# Patient Record
Sex: FEMALE | Race: WHITE | NOT HISPANIC OR LATINO | Employment: FULL TIME | URBAN - METROPOLITAN AREA
[De-identification: names, ages, dates, MRNs, and addresses within clinical notes are randomized per-mention and may not be internally consistent; named-entity substitution may affect disease eponyms.]

---

## 2023-06-26 ENCOUNTER — TELEPHONE (OUTPATIENT)
Dept: PRIMARY CARE | Facility: CLINIC | Age: 33
End: 2023-06-26
Payer: COMMERCIAL

## 2023-06-26 DIAGNOSIS — B34.9 VIRAL SYNDROME: Primary | ICD-10-CM

## 2023-06-28 ENCOUNTER — LAB (OUTPATIENT)
Dept: LAB | Facility: LAB | Age: 33
End: 2023-06-28
Payer: COMMERCIAL

## 2023-06-28 DIAGNOSIS — B34.9 VIRAL SYNDROME: ICD-10-CM

## 2023-06-28 LAB
ABO GROUP (TYPE) IN BLOOD: NORMAL
ANTIBODY SCREEN: NORMAL
CHORIOGONADOTROPIN (MIU/ML) IN SER/PLAS: 10 IU/L
ERYTHROCYTE DISTRIBUTION WIDTH (RATIO) BY AUTOMATED COUNT: 12.3 % (ref 11.5–14.5)
ERYTHROCYTE MEAN CORPUSCULAR HEMOGLOBIN CONCENTRATION (G/DL) BY AUTOMATED: 32.2 G/DL (ref 32–36)
ERYTHROCYTE MEAN CORPUSCULAR VOLUME (FL) BY AUTOMATED COUNT: 94 FL (ref 80–100)
ERYTHROCYTES (10*6/UL) IN BLOOD BY AUTOMATED COUNT: 4.6 X10E12/L (ref 4–5.2)
HEMATOCRIT (%) IN BLOOD BY AUTOMATED COUNT: 43.2 % (ref 36–46)
HEMOGLOBIN (G/DL) IN BLOOD: 13.9 G/DL (ref 12–16)
LEUKOCYTES (10*3/UL) IN BLOOD BY AUTOMATED COUNT: 6.8 X10E9/L (ref 4.4–11.3)
NRBC (PER 100 WBCS) BY AUTOMATED COUNT: 0 /100 WBC (ref 0–0)
PLATELETS (10*3/UL) IN BLOOD AUTOMATED COUNT: 189 X10E9/L (ref 150–450)
RH FACTOR: NORMAL

## 2023-06-28 PROCEDURE — 86664 EPSTEIN-BARR NUCLEAR ANTIGEN: CPT

## 2023-06-28 PROCEDURE — 86665 EPSTEIN-BARR CAPSID VCA: CPT

## 2023-06-28 PROCEDURE — 86663 EPSTEIN-BARR ANTIBODY: CPT

## 2023-06-28 PROCEDURE — 36415 COLL VENOUS BLD VENIPUNCTURE: CPT

## 2023-06-29 LAB
EBV INTERPRETATION: ABNORMAL
EPSTEIN-BARR VCA IGG: POSITIVE
EPSTEIN-BARR VCA IGM: NEGATIVE
EPSTEIN-BARR VIRUS EARLY ANTIGEN ANTIBODY, IGG: POSITIVE
EPSTIEN-BARR NUCLEAR ANTIGEN AB: POSITIVE

## 2023-06-30 ENCOUNTER — HOSPITAL ENCOUNTER (OUTPATIENT)
Dept: DATA CONVERSION | Facility: HOSPITAL | Age: 33
End: 2023-06-30
Attending: OBSTETRICS & GYNECOLOGY | Admitting: OBSTETRICS & GYNECOLOGY
Payer: COMMERCIAL

## 2023-06-30 DIAGNOSIS — Z53.8 PROCEDURE AND TREATMENT NOT CARRIED OUT FOR OTHER REASONS: ICD-10-CM

## 2023-06-30 DIAGNOSIS — N85.6 INTRAUTERINE SYNECHIAE: ICD-10-CM

## 2023-06-30 LAB — CHORIOGONADOTROPIN (MIU/ML) IN SER/PLAS: 37 MIU/ML

## 2023-07-03 LAB — CHORIOGONADOTROPIN (MIU/ML) IN SER/PLAS: 168 MIU/ML

## 2023-07-05 LAB — CHORIOGONADOTROPIN (MIU/ML) IN SER/PLAS: 509 MIU/ML

## 2023-07-12 LAB — CHORIOGONADOTROPIN (MIU/ML) IN SER/PLAS: ABNORMAL MIU/ML

## 2023-09-07 LAB
ABO GROUP (TYPE) IN BLOOD: NORMAL
ANTIBODY SCREEN: NORMAL
CHLAMYDIA TRACH., AMPLIFIED: NORMAL
ERYTHROCYTE DISTRIBUTION WIDTH (RATIO) BY AUTOMATED COUNT: 12.6 % (ref 11.5–14.5)
ERYTHROCYTE MEAN CORPUSCULAR HEMOGLOBIN CONCENTRATION (G/DL) BY AUTOMATED: 33.5 G/DL (ref 32–36)
ERYTHROCYTE MEAN CORPUSCULAR VOLUME (FL) BY AUTOMATED COUNT: 91 FL (ref 80–100)
ERYTHROCYTES (10*6/UL) IN BLOOD BY AUTOMATED COUNT: 3.95 X10E12/L (ref 4–5.2)
HEMATOCRIT (%) IN BLOOD BY AUTOMATED COUNT: 36.1 % (ref 36–46)
HEMOGLOBIN (G/DL) IN BLOOD: 12.1 G/DL (ref 12–16)
HEPATITIS B VIRUS SURFACE AG PRESENCE IN SERUM: NONREACTIVE
HEPATITIS C VIRUS AB PRESENCE IN SERUM: NONREACTIVE
HIV 1/ 2 AG/AB SCREEN: NONREACTIVE
LEUKOCYTES (10*3/UL) IN BLOOD BY AUTOMATED COUNT: 9.5 X10E9/L (ref 4.4–11.3)
N. GONORRHEA, AMPLIFIED: NORMAL
NRBC (PER 100 WBCS) BY AUTOMATED COUNT: 0 /100 WBC (ref 0–0)
PLATELETS (10*3/UL) IN BLOOD AUTOMATED COUNT: 187 X10E9/L (ref 150–450)
REFLEX ADDED, ANEMIA PANEL: ABNORMAL
RH FACTOR: NORMAL
RUBELLA VIRUS IGG AB: NORMAL
SYPHILIS TOTAL AB: NONREACTIVE

## 2023-09-08 LAB
CHLAMYDIA TRACH., AMPLIFIED: NEGATIVE
N. GONORRHEA, AMPLIFIED: NEGATIVE

## 2023-09-10 LAB
HEMOGLOBIN A2: 3 %
HEMOGLOBIN A: 96.7 %
HEMOGLOBIN F: 0.3 %
HEMOGLOBIN IDENTIFICATION INTERPRETATION: NORMAL
PATH REVIEW-HGB IDENTIFICATION: NORMAL

## 2023-09-12 LAB — URINE CULTURE: NORMAL

## 2023-09-29 VITALS — WEIGHT: 121.25 LBS | HEIGHT: 65 IN | BODY MASS INDEX: 20.2 KG/M2

## 2023-09-30 NOTE — H&P
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     OB Hx:     Leticia IUD removed Aug 30th. Conceived right away did not have a period before conception and after the IUD removal. Went in for cramping and then miscarried and had D and C in November. Has not had a period since. Had significant cramping multiple times  in February. Had some bleeding this Monday and Tues but pt says it does not feel like her normal period, more that she is seeing blood in the toilet.     Both deliveries were vaginal. No issues with deliveries.      GYN HISTORY:  History of STI or PID: no  Last pap smear: Not sure, past paps normal  History of abnormal paps: -   Mammogram: + last year for a lump which is being watched  Coitus: 2-3 x/fertile week  Pelvic pain: + since her D&C cyclically  Pain with intercourse, bowel movements or full bladder: +     MENSTRUAL HISTORY:  Menarche: 16 yo  Contraception: hormonal IUD (was on mini pill after first child)  Cycle length: Pt endorses irregular period always.  Bleeding length: irreg  Heavy bleeding -   Dysmenorrhea: -      ENDOCRINE/INFERTILITY HISTORY:  Coital Activity/week: 2-3 / week  Nipple Discharge: + on expression  Vision changes / headaches: + headaches  Excess hair growth: notes a lot of hair loss  Acne/oily skin: -   Recent weight change: -   Exercise: significant running hx, ran a marathon two years ago, runs a few times a week now 2-3 miles a week     PMH: sinusitis     MEDICATION: Buspar, vivanse, PNV folic acid     PSH: D&C     PSYCH HISTORY: ANXIETY        Allergies:        Allergies:  ·  No Known Allergies :     Home Medication Review:   Home Medications Reviewed: yes     Impression/Procedure:   ·   Impression and Planned Procedure: hysteroscopic dilation and lysis of adhesions       ERAS (Enhanced Recovery After Surgery):  ·  ERAS Patient: no     Review of Systems:   Review of Systems:  All Other Systems: All other systems reviewed and  are negative       Physical Exam by System:    Constitutional: well appearing, alert   Eyes: EOMI, no injection or icterus   Respiratory/Thorax: breathing comfortably on room  air   Cardiovascular: warm and well perfused   Extremities: Full ROM all extremities   Neurological: grossly intact, no focal deficits   Psychological: appropriate mood and affect     Consent:   COVID-19 Consent:  ·  COVID-19 Risk Consent Surgeon has reviewed key risks related to the risk of shreyas COVID-19 and if they contract COVID-19 what the risks are.     Attestation:   Note Completion:  I am a:  Resident/Fellow   Attending Attestation I saw and evaluated the patient.  I personally obtained the key and critical portions of the history and physical exam or was physically present for key and  critical portions performed by the resident/fellow. I reviewed the resident/fellow?s documentation and discussed the patient with the resident/fellow.  I agree with the resident/fellow?s medical decision making as documented in the note.     I personally evaluated the patient on 30-Jun-2023         Electronic Signatures:  Maribel Arellano)  (Signed 05-Jul-2023 08:38)   Authored: Note Completion   Co-Signer: History of Present Illness, Allergies, Home Medication Review, Impression/Procedure, ERAS, Review of Systems, Physical Exam,  Consent, Note Completion  Lizette Hernandez (Resident))  (Signed 29-Jun-2023 18:26)   Authored: History of Present Illness, Allergies, Home  Medication Review, Impression/Procedure, ERAS, Review of Systems, Physical Exam, Consent, Note Completion      Last Updated: 05-Jul-2023 08:38 by Maribel Arellano)

## 2023-10-03 ENCOUNTER — TELEPHONE (OUTPATIENT)
Dept: OBSTETRICS AND GYNECOLOGY | Facility: CLINIC | Age: 33
End: 2023-10-03
Payer: COMMERCIAL

## 2023-10-03 NOTE — TELEPHONE ENCOUNTER
Phoned Pt.  Pt identified by name and .  Advised of message per Dr Nichols.  We can stop the Zofran and try Reglan 10 mg TID. It should help with the constipation and still relieve the nausea.   Pt verbally understands.  Questions answered and concerns addressed.

## 2023-10-05 ENCOUNTER — APPOINTMENT (OUTPATIENT)
Dept: OBSTETRICS AND GYNECOLOGY | Facility: CLINIC | Age: 33
End: 2023-10-05
Payer: COMMERCIAL

## 2023-10-06 ENCOUNTER — TELEPHONE (OUTPATIENT)
Dept: OBSTETRICS AND GYNECOLOGY | Facility: CLINIC | Age: 33
End: 2023-10-06
Payer: COMMERCIAL

## 2023-10-06 DIAGNOSIS — O21.9 NAUSEA AND VOMITING IN PREGNANCY (HHS-HCC): Primary | ICD-10-CM

## 2023-10-09 NOTE — TELEPHONE ENCOUNTER
Phoned Pt.  Pt identified by name and .  Advised of message per Dr Nichols regarding Rx for Regalan.  The prescription should be at the pharmacy still. If she wants us to send it to a different pharmacy we can do that.   Pt verbally understands.  Questions answered and concerns addressed.

## 2023-10-10 ENCOUNTER — TELEPHONE (OUTPATIENT)
Dept: OBSTETRICS AND GYNECOLOGY | Facility: CLINIC | Age: 33
End: 2023-10-10
Payer: COMMERCIAL

## 2023-10-10 RX ORDER — METOCLOPRAMIDE 10 MG/1
10 TABLET ORAL 3 TIMES DAILY
Qty: 90 TABLET | Refills: 1 | Status: SHIPPED | OUTPATIENT
Start: 2023-10-10 | End: 2023-10-12

## 2023-10-10 NOTE — TELEPHONE ENCOUNTER
Phoned Pt.  Pt identified by name and .  OB 18 weeks was suppose to have a follow up ultrasound at 16 weeks wants to know if she should reschedule.  Advised yes she will call to schedule.  Questions answered and concerns addressed.

## 2023-10-10 NOTE — TELEPHONE ENCOUNTER
Dr Nichols ,  Pt was suppose to have Rx for Reglan last week.  Rx is not at the Pharmacy.  I called and they do not have the Rx.  She taking Zofran, but it is making her constipated.  Thank you      Detailed message left on voicmail.  Regarding Dr Nichols resent her Rx for Reglan to Pharmacy on file.

## 2023-10-11 ENCOUNTER — ANCILLARY PROCEDURE (OUTPATIENT)
Dept: RADIOLOGY | Facility: CLINIC | Age: 33
End: 2023-10-11
Payer: COMMERCIAL

## 2023-10-11 ENCOUNTER — CLINICAL SUPPORT (OUTPATIENT)
Dept: GENETICS | Facility: CLINIC | Age: 33
End: 2023-10-11
Payer: COMMERCIAL

## 2023-10-11 VITALS
HEART RATE: 79 BPM | DIASTOLIC BLOOD PRESSURE: 74 MMHG | WEIGHT: 138 LBS | BODY MASS INDEX: 22.96 KG/M2 | SYSTOLIC BLOOD PRESSURE: 111 MMHG

## 2023-10-11 DIAGNOSIS — Z3A.16 16 WEEKS GESTATION OF PREGNANCY (HHS-HCC): ICD-10-CM

## 2023-10-11 DIAGNOSIS — O28.5 ABNORMAL GENETIC TEST DURING PREGNANCY: ICD-10-CM

## 2023-10-11 PROBLEM — Z28.39 RUBELLA NON-IMMUNE STATUS, ANTEPARTUM (HHS-HCC): Status: ACTIVE | Noted: 2023-10-11

## 2023-10-11 PROBLEM — F41.9 ANXIETY: Status: ACTIVE | Noted: 2023-10-11

## 2023-10-11 PROBLEM — Z34.82 PRENATAL CARE, SUBSEQUENT PREGNANCY IN SECOND TRIMESTER (HHS-HCC): Status: ACTIVE | Noted: 2023-10-11

## 2023-10-11 PROBLEM — F90.9 ADHD (ATTENTION DEFICIT HYPERACTIVITY DISORDER): Status: ACTIVE | Noted: 2023-10-11

## 2023-10-11 PROBLEM — O09.899 RUBELLA NON-IMMUNE STATUS, ANTEPARTUM (HHS-HCC): Status: ACTIVE | Noted: 2023-10-11

## 2023-10-11 PROBLEM — O23.41 URINARY TRACT INFECTION IN MOTHER DURING FIRST TRIMESTER OF PREGNANCY (HHS-HCC): Status: ACTIVE | Noted: 2023-10-11

## 2023-10-11 PROBLEM — E78.5 HYPERLIPEMIA: Status: ACTIVE | Noted: 2023-10-11

## 2023-10-11 PROCEDURE — 76815 OB US LIMITED FETUS(S): CPT

## 2023-10-11 PROCEDURE — GENMD PR GENETICS VISIT (MEDICAID/MEDICARE): Performed by: GENETIC COUNSELOR, MS

## 2023-10-11 PROCEDURE — 76817 TRANSVAGINAL US OBSTETRIC: CPT

## 2023-10-11 PROCEDURE — 76815 OB US LIMITED FETUS(S): CPT | Performed by: STUDENT IN AN ORGANIZED HEALTH CARE EDUCATION/TRAINING PROGRAM

## 2023-10-11 PROCEDURE — 76817 TRANSVAGINAL US OBSTETRIC: CPT | Performed by: STUDENT IN AN ORGANIZED HEALTH CARE EDUCATION/TRAINING PROGRAM

## 2023-10-11 ASSESSMENT — PAIN SCALES - GENERAL: PAINLEVEL: 0-NO PAIN

## 2023-10-12 ENCOUNTER — ROUTINE PRENATAL (OUTPATIENT)
Dept: OBSTETRICS AND GYNECOLOGY | Facility: CLINIC | Age: 33
End: 2023-10-12
Payer: COMMERCIAL

## 2023-10-12 ENCOUNTER — LAB (OUTPATIENT)
Dept: LAB | Facility: LAB | Age: 33
End: 2023-10-12
Payer: COMMERCIAL

## 2023-10-12 VITALS — SYSTOLIC BLOOD PRESSURE: 110 MMHG | DIASTOLIC BLOOD PRESSURE: 64 MMHG | WEIGHT: 135 LBS | BODY MASS INDEX: 22.47 KG/M2

## 2023-10-12 DIAGNOSIS — O35.9XX0 FETAL ABNORMALITY AFFECTING MANAGEMENT OF MOTHER, SINGLE OR UNSPECIFIED FETUS (HHS-HCC): ICD-10-CM

## 2023-10-12 DIAGNOSIS — O35.9XX0 FETAL ABNORMALITY AFFECTING MANAGEMENT OF MOTHER, SINGLE OR UNSPECIFIED FETUS (HHS-HCC): Primary | ICD-10-CM

## 2023-10-12 DIAGNOSIS — O44.02 PLACENTA PREVIA IN SECOND TRIMESTER (HHS-HCC): ICD-10-CM

## 2023-10-12 DIAGNOSIS — Z3A.18 18 WEEKS GESTATION OF PREGNANCY (HHS-HCC): ICD-10-CM

## 2023-10-12 PROCEDURE — 99214 OFFICE O/P EST MOD 30 MIN: CPT | Performed by: OBSTETRICS & GYNECOLOGY

## 2023-10-12 PROCEDURE — 36415 COLL VENOUS BLD VENIPUNCTURE: CPT

## 2023-10-12 NOTE — PROGRESS NOTES
Stopped the Zofran. Constipation improved. Still some nausea, but no meds.   Has gone 2 days without vomiting.   Had USN yesterday- normal anatomy, female gender identified. Previous testing showed male fetus. Saw genetics. Discussed results again today. Will do PreQuel testing.   Ultrasound also shows placenta previa. Discussed risk of bleeding and that we will watch this as well.     Medical Problems       Problem List       ADHD (attention deficit hyperactivity disorder)    Anxiety    Hyperlipemia    Prenatal care, subsequent pregnancy in second trimester    Overview Signed 10/11/2023  7:24 AM by Brisa Nichols MD     Declined genetics         Urinary tract infection in mother during first trimester of pregnancy    Overview Signed 10/11/2023  7:25 AM by Brisa Nichols MD     Treated with Macrobid         Rubella non-immune status, antepartum    Overview Signed 10/11/2023  7:26 AM by Brisa Nichols MD     Rubella equivocal. Needs MMR postpartum.         Placenta previa in second trimester    Overview Signed 10/12/2023 12:12 PM by Brisa Nichols MD     Monitor with Ultrasounds         Fetal abnormality affecting management of mother    Overview Signed 10/12/2023 12:13 PM by Brisa Nichols MD     Did testing with outside lab which showed male gender. Ultrasound shows female gender. Will send for PreQuel testing.   Did see genetics for counseling on 10/11/23                Continue USN every other week for cervical lengths.

## 2023-10-14 ENCOUNTER — HOSPITAL ENCOUNTER (OUTPATIENT)
Facility: HOSPITAL | Age: 33
End: 2023-10-14
Attending: OBSTETRICS & GYNECOLOGY | Admitting: OBSTETRICS & GYNECOLOGY
Payer: COMMERCIAL

## 2023-10-14 ENCOUNTER — HOSPITAL ENCOUNTER (OUTPATIENT)
Facility: HOSPITAL | Age: 33
Discharge: HOME | End: 2023-10-14
Attending: OBSTETRICS & GYNECOLOGY | Admitting: OBSTETRICS & GYNECOLOGY
Payer: COMMERCIAL

## 2023-10-14 VITALS
HEART RATE: 86 BPM | BODY MASS INDEX: 22.85 KG/M2 | SYSTOLIC BLOOD PRESSURE: 113 MMHG | TEMPERATURE: 97.2 F | HEIGHT: 65 IN | WEIGHT: 137.13 LBS | OXYGEN SATURATION: 98 % | RESPIRATION RATE: 18 BRPM | DIASTOLIC BLOOD PRESSURE: 64 MMHG

## 2023-10-14 PROBLEM — Z3A.18 18 WEEKS GESTATION OF PREGNANCY (HHS-HCC): Status: ACTIVE | Noted: 2023-10-14

## 2023-10-14 PROBLEM — K59.00 CONSTIPATION: Status: ACTIVE | Noted: 2023-10-14

## 2023-10-14 LAB
ABO GROUP (TYPE) IN BLOOD: NORMAL
ANTIBODY SCREEN: NORMAL
BASOPHILS # BLD AUTO: 0.02 X10*3/UL (ref 0–0.1)
BASOPHILS NFR BLD AUTO: 0.3 %
BILIRUBIN, POC: NEGATIVE
BLOOD URINE, POC: POSITIVE
BLOOD, POC: NORMAL
CLARITY, POC: CLEAR
COLOR, POC: NORMAL
EOSINOPHIL # BLD AUTO: 0.09 X10*3/UL (ref 0–0.7)
EOSINOPHIL NFR BLD AUTO: 1.2 %
ERYTHROCYTE [DISTWIDTH] IN BLOOD BY AUTOMATED COUNT: 12.8 % (ref 11.5–14.5)
GLUCOSE URINE, POC: NEGATIVE
HCT VFR BLD AUTO: 32.4 % (ref 36–46)
HGB BLD-MCNC: 11.2 G/DL (ref 12–16)
IMM GRANULOCYTES # BLD AUTO: 0.04 X10*3/UL (ref 0–0.7)
IMM GRANULOCYTES NFR BLD AUTO: 0.5 % (ref 0–0.9)
KETONES, POC: NEGATIVE
KETONES, POC: NEGATIVE
LEUKOCYTE EST, POC: NEGATIVE
LEUKOCYTES (/UL) IN BODY FLUID: NORMAL
LYMPHOCYTES # BLD AUTO: 1.87 X10*3/UL (ref 1.2–4.8)
LYMPHOCYTES NFR BLD AUTO: 25.1 %
Lab: NEGATIVE
Lab: NEGATIVE MG/DL
MCH RBC QN AUTO: 31.5 PG (ref 26–34)
MCHC RBC AUTO-ENTMCNC: 34.6 G/DL (ref 32–36)
MCV RBC AUTO: 91 FL (ref 80–100)
MONOCYTES # BLD AUTO: 0.47 X10*3/UL (ref 0.1–1)
MONOCYTES NFR BLD AUTO: 6.3 %
NEUTROPHILS # BLD AUTO: 4.97 X10*3/UL (ref 1.2–7.7)
NEUTROPHILS NFR BLD AUTO: 66.6 %
NITRITE, POC: NEGATIVE
NRBC BLD-RTO: 0 /100 WBCS (ref 0–0)
PH, POC: 5.5
PLATELET # BLD AUTO: 152 X10*3/UL (ref 150–450)
PMV BLD AUTO: 11.3 FL (ref 7.5–11.5)
PROTEIN (MG/DL) IN URINE: NEGATIVE MG/DL
RBC # BLD AUTO: 3.55 X10*6/UL (ref 4–5.2)
RH FACTOR (ANTIGEN D): NORMAL
SPECIFIC GRAVITY, POC: 1
URINE PROTEIN, POC: NEGATIVE
UROBILINOGEN, POC: 0.2
WBC # BLD AUTO: 7.5 X10*3/UL (ref 4.4–11.3)

## 2023-10-14 PROCEDURE — 86850 RBC ANTIBODY SCREEN: CPT | Performed by: OBSTETRICS & GYNECOLOGY

## 2023-10-14 PROCEDURE — 99214 OFFICE O/P EST MOD 30 MIN: CPT

## 2023-10-14 PROCEDURE — 99215 OFFICE O/P EST HI 40 MIN: CPT

## 2023-10-14 PROCEDURE — 87086 URINE CULTURE/COLONY COUNT: CPT | Mod: CMCLAB | Performed by: OBSTETRICS & GYNECOLOGY

## 2023-10-14 PROCEDURE — 99215 OFFICE O/P EST HI 40 MIN: CPT | Performed by: OBSTETRICS & GYNECOLOGY

## 2023-10-14 PROCEDURE — 81002 URINALYSIS NONAUTO W/O SCOPE: CPT | Performed by: OBSTETRICS & GYNECOLOGY

## 2023-10-14 PROCEDURE — 85025 COMPLETE CBC W/AUTO DIFF WBC: CPT | Performed by: OBSTETRICS & GYNECOLOGY

## 2023-10-14 PROCEDURE — 36415 COLL VENOUS BLD VENIPUNCTURE: CPT | Performed by: OBSTETRICS & GYNECOLOGY

## 2023-10-14 PROCEDURE — 2500000001 HC RX 250 WO HCPCS SELF ADMINISTERED DRUGS (ALT 637 FOR MEDICARE OP): Performed by: OBSTETRICS & GYNECOLOGY

## 2023-10-14 RX ORDER — NIFEDIPINE 10 MG/1
10 CAPSULE ORAL ONCE AS NEEDED
Status: DISCONTINUED | OUTPATIENT
Start: 2023-10-14 | End: 2023-10-14 | Stop reason: HOSPADM

## 2023-10-14 RX ORDER — HYDRALAZINE HYDROCHLORIDE 20 MG/ML
5 INJECTION INTRAMUSCULAR; INTRAVENOUS ONCE AS NEEDED
Status: DISCONTINUED | OUTPATIENT
Start: 2023-10-14 | End: 2023-10-14 | Stop reason: HOSPADM

## 2023-10-14 RX ORDER — LIDOCAINE HYDROCHLORIDE 10 MG/ML
0.5 INJECTION INFILTRATION; PERINEURAL ONCE AS NEEDED
Status: DISCONTINUED | OUTPATIENT
Start: 2023-10-14 | End: 2023-10-14 | Stop reason: HOSPADM

## 2023-10-14 RX ORDER — LABETALOL HYDROCHLORIDE 5 MG/ML
20 INJECTION, SOLUTION INTRAVENOUS ONCE AS NEEDED
Status: DISCONTINUED | OUTPATIENT
Start: 2023-10-14 | End: 2023-10-14 | Stop reason: HOSPADM

## 2023-10-14 RX ORDER — ONDANSETRON HYDROCHLORIDE 2 MG/ML
4 INJECTION, SOLUTION INTRAVENOUS EVERY 6 HOURS PRN
Status: DISCONTINUED | OUTPATIENT
Start: 2023-10-14 | End: 2023-10-14 | Stop reason: HOSPADM

## 2023-10-14 RX ORDER — BISACODYL 10 MG/1
10 SUPPOSITORY RECTAL ONCE
Status: COMPLETED | OUTPATIENT
Start: 2023-10-14 | End: 2023-10-14

## 2023-10-14 RX ORDER — ONDANSETRON 4 MG/1
4 TABLET, FILM COATED ORAL EVERY 6 HOURS PRN
Status: DISCONTINUED | OUTPATIENT
Start: 2023-10-14 | End: 2023-10-14 | Stop reason: HOSPADM

## 2023-10-14 RX ADMIN — BISACODYL 10 MG: 10 SUPPOSITORY RECTAL at 04:16

## 2023-10-14 ASSESSMENT — PAIN SCALES - GENERAL
PAINLEVEL: 0 - NO PAIN
PAINLEVEL_OUTOF10: 0 - NO PAIN

## 2023-10-14 NOTE — H&P
Obstetrical Triage History and Physical     Ellen Crawley is a 32 y.o. .     Chief Complaint: Vaginal Bleeding (Pink drainage when urinating with vaginal pressure and constipation)    Assessment/Plan    18-week pregnancy with known placental previa by ultrasound.  Also known to have cervix which is within normal range but short.  Patient presents with some pinkish discharge in the urine and wiping x1.  Patient also complaining of pelvic pressure and constipation for several weeks after starting Zofran for nausea.  Currently has been off Zofran for 3 days but still having issues with bowel movements.  On Colace.  Exam today is unremarkable no blood was noted in the vaginal vault cervix appeared to be closed 3 cm in visual exam no leakage of fluid.  No contractions or abdominal pain noted.  Urinalysis was negative on admission.  At this point will observe we will treat constipation.  Reevaluate in 1 to 2 hours.    Patient observed over 4 to 5 hours.  No episodes of any bleeding noted.  Patient has urinated no blood noted.  Urinalysis is negative.  Current exam is clinically unremarkable.  Patient will be discharged today 18-week pregnancy with placenta previa and borderline cervix.  We will follow-up with attending on Monday or Tuesday.  Instructions and precautions given    Active Problems:  There are no active Hospital Problems.      Pregnancy Problems (from 23 to present)       Problem Noted Resolved    Placenta previa in second trimester 10/12/2023 by Brisa Nichols MD No    Priority:  Medium      Overview Signed 10/12/2023 12:12 PM by Brisa Nichols MD     Monitor with Ultrasounds         Fetal abnormality affecting management of mother 10/12/2023 by Brisa Nichols MD No    Priority:  Medium      Overview Signed 10/12/2023 12:13 PM by Brisa Nichols MD     Did testing with outside lab which showed male gender. Ultrasound shows female gender. Will send for PreQuel testing.    Did see genetics for counseling on 10/11/23           Prenatal care, subsequent pregnancy in second trimester 10/11/2023 by Brisa Nichols MD No    Priority:  Medium      Overview Signed 10/11/2023  7:24 AM by Brisa Nichols MD     Declined genetics         Urinary tract infection in mother during first trimester of pregnancy 10/11/2023 by Brisa Nichols MD No    Priority:  Medium      Overview Signed 10/11/2023  7:25 AM by Brisa Nichols MD     Treated with Macrobid         Rubella non-immune status, antepartum 10/11/2023 by Brisa Nichols MD No    Priority:  Medium      Overview Signed 10/11/2023  7:26 AM by Brisa Nichols MD     Rubella equivocal. Needs MMR postpartum.               Shania Hughes is here with 18-week pregnancy,  the cervix noted to be approximately 2.9 cm.  Normal range but on the short side.  Also noted to have posterior placenta previa on ultrasound.  Some discrepancy in sex of baby with discrepancy between blood testing and ultrasound as well.  Genetics have been sent off.  Tonight the patient noted some increased pelvic pressure has been complaining of constipation for several weeks after starting Zofran for nausea.  Patient currently is off her Zofran for 3 days.  Noticed some pinkish Color in the urine today.  Also noticed some pink when wiping.  Very faint.  No abdominal pain just the pressure.  No contractions.       Obstetrical History   OB History    Para Term  AB Living   4 2 2   1 2   SAB IAB Ectopic Multiple Live Births   1       1      # Outcome Date GA Lbr Tony/2nd Weight Sex Delivery Anes PTL Lv   4 Current            3 Term 17 36w0d  3402 g M Vag-Spont None N    2 Term 14 36w0d  2778 g M Vag-Spont None N IVAN   1 SAB                Past Medical History  Past Medical History:   Diagnosis Date    Personal history of other diseases of the respiratory system 2021    History of acute bacterial sinusitis     Unspecified amblyopia, left eye     Amblyopia of left eye    Urinary tract infection         Past Surgical History   Past Surgical History:   Procedure Laterality Date    OTHER SURGICAL HISTORY  02/10/2021    Abscess incision and drainage       Social History  Social History     Tobacco Use    Smoking status: Never     Passive exposure: Never    Smokeless tobacco: Never   Substance Use Topics    Alcohol use: Not Currently     Substance and Sexual Activity   Drug Use Never       Allergies  Clindamycin     Medications  No medications prior to admission.       Objective    Last Vitals  Temp Pulse Resp BP MAP O2 Sat     86   113/64   98 %     Physical Examination  GENERAL: Examination reveals a well developed, well nourished, gravid female in no acute distress. She is alert and cooperative.  HEENT: PERRLA. External ears normal. Nose normal, no erythema or discharge. Mouth and throat clear.  NECK: supple, no significant adenopathy  LUNGS: clear to auscultation bilaterally  HEART: regular rate and rhythm, S1, S2 normal, no murmur, click, rub or gallop  ABDOMEN: Fundus compatible with 18-week pregnancy  FHR is 144 BPM, with  Fetal heart rate baseline approximately 140     [place] reading:    VAGINA: normal appearing vagina with normal color and discharge and no lesions noted  CERVIX: ; MEMBRANES are Intact  EXTREMITIES: no redness or tenderness in the calves or thighs, no edema  PSYCHOLOGICAL: awake and alert; oriented to person, place, and time    Lab Review  Urinalysis negative.  Urine culture sent

## 2023-10-14 NOTE — NURSING NOTE
Due to Advent reasons, consent to treat obtained verbally with DARRIAN Lozada RN and witnessed by MARGUERITE Miner, .

## 2023-10-15 LAB — BACTERIA UR CULT: NO GROWTH

## 2023-10-16 ENCOUNTER — TELEPHONE (OUTPATIENT)
Dept: OBSTETRICS AND GYNECOLOGY | Facility: CLINIC | Age: 33
End: 2023-10-16
Payer: COMMERCIAL

## 2023-10-16 ENCOUNTER — APPOINTMENT (OUTPATIENT)
Dept: LAB | Facility: LAB | Age: 33
End: 2023-10-16
Payer: COMMERCIAL

## 2023-10-16 NOTE — TELEPHONE ENCOUNTER
Phoned Pt..  Pt identified by name and .  Pt seen in L&D at [facility] on Saturday for some spotting.  Everything checked out fine and was sent home.  They told her to keep her scheduled apt's.  She has an Ultrasound on Oct 24th and an OB visit on  with Dr Nichols.  Advised to call back if she is having any problems or concerns.  Pt verbally understands.  Questions answered and concerns addressed.

## 2023-10-16 NOTE — TELEPHONE ENCOUNTER
"Phoned Pt.  Pt identified by name and .  Advised of message per jellyfish Prequel testing.  Tiffany (jellyfish)  Patient needs to redraw for Prequel . Also Will need order Prequel order . Order needs to say\" Reprint order Requisition \"  [phone] ex [phone]   Pt verbally understands.  Questions answered and concerns addressed.  New kit and req placed at  for pt to  and take to Lab.  "

## 2023-10-18 NOTE — PROGRESS NOTES
"Thank you for the referral of Ellen Crawley.  She is a 32 year old,  (1 SAB), female who was 18 2/7 weeks pregnant at the time of our appointment with an EDC of 2024.  She was seen for genetic counseling due to discordant fetal sex on ultrasound v. [employer] testing.         PAST HISTORY:  Patient had [employer] cell free DNA testing for fetal sex (direct to consumer testing; self collection at home) which indicated male fetal sex per patient report. Due to history of  deliveries (reportedly one spontaneous at 36 weeks, another induced at 36 weeks due to oligohydramnios) patient had ultrasound today for cervical length assessment at which time fetal genitalia appear female. Due to this discrepancy, genetic counseling was offered/performed same day.     FAMILY HISTORY  Medical and family histories were NOT reviewed in detail at today's appointment.     SELF REPORTED RACE/ETHNICITY:  Patient: Not assessed  Patient's partner: Not assessed     COUNSELING:    The following information was discussed with your patient:    1. We discussed that females typically have two \"X\" chromosomes while males typically have one \"X\" chromosome and one \"Y\" chromosome. [employer] testing analyzes cell free DNA in the maternal blood to determine if any DNA from the \"Y\" chromosome is present. If \"Y\" chromosome DNA is detected, results indicate suspected male fetal sex.  Per the patient, results were consistent with a male fetus. [facility] reports that their accuracy for fetal sex reporting is 99.9%; however clinical laboratories typically report accuracy rates of 97-99% for fetal sex reporting.     2. Ultrasound today is consistent with female fetal genitalia. We discussed that accuracy of ultrasound in determining fetal sex is approximately 98-99%.     3. Possible reasons for the discrepancy between S[employer] and ultrasound results regarding fetal sex include:  -false positive/incorrect results from lab or " ultrasound  -laboratory processing error/sample mix-up  -contamination of sample with other male DNA from the household during self collection  -male vanishing twin  -patient undergoing recent blood transfusion or organ transplant from a male donor (she denies)  -fetal disorder of sexual development with discordant fetal genetic sex and physically appearing sex    4. Repeat cfDNA screening through a clinical laboratory may be considered to determine if reported sex through a clinical laboratory is consistent with phenotypic sex from ultrasound. This testing, however, also includes screening for aneuploidies associated with chromosomes 13, 18, 21 and the sex chromosomes as fetal sex reporting cannot be ordered clinically as an independent test. Diagnostic chromosome testing to determine fetal sex may also be considered via amniocentesis. See details of these tests below.       5. The availability, benefits and limitations of non-invasive prenatal screening.  We discussed the methodology for this testing, which includes using cell-free DNA obtained from a mother's blood to screen for the presence of common chromosomal abnormalities (trisomies 21, 13, 18, and sex chromosome aneuploidies). Depending on the laboratory used, there is a >99% sensitivity for Down syndrome, at least 97.4% sensitivity for trisomy 18, and at least 87.5% sensitivity for trisomy 13.  Specificity for these trisomies is >99%. Although sensitivity and specificity rates are high, not all positive results are confirmed to be true positives. False negative results are rare. In addition, anticoagulants, maternal chromosome abnormality, fibroids or malignancy may impact results and/or be associated with inconclusive or other abnormal findings. Therefore, in the event of an abnormal result, prenatal diagnosis through amniocentesis should be considered to confirm the findings.  Results take approximately 7-10 days.      6 The availability of diagnostic  fetal chromosome analysis via amniocentesis. The methods, benefits, limitations and risks of amniocentesis and a 1 in 400 risk of complications, including a 1 in 800 risk of miscarriage.    8. Carrier screening was not addressed at today's appointment.               DISPOSITION:  The patient stated that she understood the above information. She is uncertain if she wants to proceed with clinical cfDNA screening as she is not interested in aneuploidy screening as well as concerned about possibility of/implications of positive results which could raise additional anxiety, stated she likely would not want to proceed with amniocentesis. She stated she would think about this option and discuss further with her OB at her routine prenatal visit tomorrow.         June Almaraz MS, Licensed Genetic Counselor spent 25 minutes with the patient with greater than 50% of the time spent in face to face counseling.     Thank you for allowing us to participate in the care of your patient.  Should you or your patient have any questions, please do not hesitate to contact our office at [phone].      Sincerely,      June Almaraz MS  Licensed Genetic Counselor

## 2023-10-24 ENCOUNTER — ANCILLARY PROCEDURE (OUTPATIENT)
Dept: RADIOLOGY | Facility: CLINIC | Age: 33
End: 2023-10-24
Payer: COMMERCIAL

## 2023-10-24 DIAGNOSIS — Z3A.19 19 WEEKS GESTATION OF PREGNANCY (HHS-HCC): ICD-10-CM

## 2023-10-24 PROCEDURE — 76817 TRANSVAGINAL US OBSTETRIC: CPT | Performed by: OBSTETRICS & GYNECOLOGY

## 2023-10-24 PROCEDURE — 76811 OB US DETAILED SNGL FETUS: CPT

## 2023-10-24 PROCEDURE — 76811 OB US DETAILED SNGL FETUS: CPT | Performed by: OBSTETRICS & GYNECOLOGY

## 2023-11-01 ENCOUNTER — TELEPHONE (OUTPATIENT)
Dept: OBSTETRICS AND GYNECOLOGY | Facility: CLINIC | Age: 33
End: 2023-11-01
Payer: COMMERCIAL

## 2023-11-01 NOTE — TELEPHONE ENCOUNTER
----- Message from Shakira Mayberry RN sent at 2023 10:41 AM EDT -----    ----- Message -----  From: Brisa Nichols MD  Sent: 2023   7:26 AM EDT  [facility]    Normal results.       Phoned Pt.  Pt identified by name and .  Advised of message per Dr Oakes regarding her [facility] Prequel Testing results.  Pt verbally understands.  No questions or concerns at this time.

## 2023-11-02 ENCOUNTER — ROUTINE PRENATAL (OUTPATIENT)
Dept: OBSTETRICS AND GYNECOLOGY | Facility: CLINIC | Age: 33
End: 2023-11-02
Payer: COMMERCIAL

## 2023-11-02 VITALS — WEIGHT: 137 LBS | BODY MASS INDEX: 23.15 KG/M2 | SYSTOLIC BLOOD PRESSURE: 118 MMHG | DIASTOLIC BLOOD PRESSURE: 68 MMHG

## 2023-11-02 DIAGNOSIS — O44.02 PLACENTA PREVIA IN SECOND TRIMESTER (HHS-HCC): Primary | ICD-10-CM

## 2023-11-02 DIAGNOSIS — Z34.82 PRENATAL CARE, SUBSEQUENT PREGNANCY IN SECOND TRIMESTER (HHS-HCC): ICD-10-CM

## 2023-11-02 DIAGNOSIS — O92.29: ICD-10-CM

## 2023-11-02 DIAGNOSIS — Z3A.21 21 WEEKS GESTATION OF PREGNANCY (HHS-HCC): ICD-10-CM

## 2023-11-02 DIAGNOSIS — N64.4: ICD-10-CM

## 2023-11-02 PROCEDURE — H1000 PRENATAL CARE ATRISK ASSESSM: HCPCS | Performed by: OBSTETRICS & GYNECOLOGY

## 2023-11-02 PROCEDURE — 99213 OFFICE O/P EST LOW 20 MIN: CPT | Performed by: OBSTETRICS & GYNECOLOGY

## 2023-11-02 NOTE — PROGRESS NOTES
"Subjective   Patient ID [id number]   Ellen Crawley is a 32 y.o.  at 21w3d with a working estimated date of delivery of 3/11/2024, by Last Menstrual Period who presents for a routine prenatal visit. She denies vaginal bleeding, leakage of fluid, decreased fetal movements, or contractions.    Prequel results show normal female.   Previa- no bleeding. Discussed exercise. Has follow-up ultrasound next week.   After last delivery- had to have breast \"opened up\" and drained. Having some discomfort in her left breast.  Starting to have some flash backs. Sees a counselor. Will consult Yoselyn Coronado.       Objective   Physical Exam  Weight: 62.1 kg (137 lb)  Expected Total Weight Gain: 11.5 kg (25 lb)-16 kg (35 lb)   Pregravid BMI: 20.46  BP: 118/68  Fetal Heart Rate: 142 Fundal Height (cm): 19 cm      Problem List Items Addressed This Visit       Prenatal care, subsequent pregnancy in second trimester    Overview     Declined genetics         Placenta previa in second trimester - Primary    Overview     Monitor with Ultrasounds         Current Assessment & Plan     USN next week          Other Visit Diagnoses       Breast pain in pregnancy        21 weeks gestation of pregnancy               "

## 2023-11-06 ENCOUNTER — ANCILLARY PROCEDURE (OUTPATIENT)
Dept: RADIOLOGY | Facility: CLINIC | Age: 33
End: 2023-11-06
Payer: COMMERCIAL

## 2023-11-06 DIAGNOSIS — Z3A.19 19 WEEKS GESTATION OF PREGNANCY (HHS-HCC): ICD-10-CM

## 2023-11-06 PROCEDURE — 76817 TRANSVAGINAL US OBSTETRIC: CPT | Performed by: OBSTETRICS & GYNECOLOGY

## 2023-11-06 PROCEDURE — 76815 OB US LIMITED FETUS(S): CPT

## 2023-11-06 PROCEDURE — 76815 OB US LIMITED FETUS(S): CPT | Performed by: OBSTETRICS & GYNECOLOGY

## 2023-11-06 PROCEDURE — 76817 TRANSVAGINAL US OBSTETRIC: CPT

## 2023-11-17 ENCOUNTER — TELEPHONE (OUTPATIENT)
Dept: OBSTETRICS AND GYNECOLOGY | Facility: CLINIC | Age: 33
End: 2023-11-17
Payer: COMMERCIAL

## 2023-11-17 ENCOUNTER — TELEPHONE (OUTPATIENT)
Dept: OBSTETRICS AND GYNECOLOGY | Facility: HOSPITAL | Age: 33
End: 2023-11-17

## 2023-11-17 NOTE — TELEPHONE ENCOUNTER
Phoned Pt.  Pt identified by name and .  OB 23-4 with symptoms of vaginal yeast infection.  Wants to know what she can take.  Advised she can get Monistat 3 or 7 day over the counter.  She would like to try that.  Advised if after she uses the cream to wait a few days if not better to make an apt,.  Pt verbally understands.  Questions answered and concerns addressed.

## 2023-11-20 DIAGNOSIS — R20.8 SKIN PAIN: ICD-10-CM

## 2023-11-20 DIAGNOSIS — N89.8 VAGINAL IRRITATION: ICD-10-CM

## 2023-11-20 NOTE — TELEPHONE ENCOUNTER
Called pt, no answer, unable to leave voicemail for return call  RE: Rx to pharmacy for pick for yeast symptoms

## 2023-11-20 NOTE — TELEPHONE ENCOUNTER
contacted pt  name and  verified  Spoke with pt made aware the referral for dermatology is in place for her to make appt.  After speaking with pt she states she is still having yeast infection symptoms after day 3 of taking over the counter nystatin medication  She called to schedule appt with the scheduling line they gave her 23

## 2023-11-21 RX ORDER — FLUCONAZOLE 150 MG/1
150 TABLET ORAL ONCE
Qty: 1 TABLET | Refills: 0 | Status: SHIPPED | OUTPATIENT
Start: 2023-11-21 | End: 2023-11-28 | Stop reason: HOSPADM

## 2023-11-21 NOTE — TELEPHONE ENCOUNTER
Called pt, no answer, left voicemail for return call  Left message making pt aware rx was sent to pharmacy

## 2023-11-27 ENCOUNTER — TELEPHONE (OUTPATIENT)
Dept: OBSTETRICS AND GYNECOLOGY | Facility: CLINIC | Age: 33
End: 2023-11-27

## 2023-11-27 ENCOUNTER — HOSPITAL ENCOUNTER (OUTPATIENT)
Facility: HOSPITAL | Age: 33
Discharge: HOME | End: 2023-11-27
Attending: OBSTETRICS & GYNECOLOGY | Admitting: OBSTETRICS & GYNECOLOGY
Payer: COMMERCIAL

## 2023-11-27 VITALS
RESPIRATION RATE: 16 BRPM | OXYGEN SATURATION: 97 % | WEIGHT: 141.31 LBS | BODY MASS INDEX: 23.54 KG/M2 | SYSTOLIC BLOOD PRESSURE: 108 MMHG | HEART RATE: 77 BPM | TEMPERATURE: 98.1 F | HEIGHT: 65 IN | DIASTOLIC BLOOD PRESSURE: 64 MMHG

## 2023-11-27 PROCEDURE — 99214 OFFICE O/P EST MOD 30 MIN: CPT | Mod: 25

## 2023-11-27 PROCEDURE — 94760 N-INVAS EAR/PLS OXIMETRY 1: CPT

## 2023-11-27 PROCEDURE — 87636 SARSCOV2 & INF A&B AMP PRB: CPT

## 2023-11-27 PROCEDURE — 99212 OFFICE O/P EST SF 10 MIN: CPT

## 2023-11-27 RX ORDER — ONDANSETRON HYDROCHLORIDE 2 MG/ML
4 INJECTION, SOLUTION INTRAVENOUS EVERY 6 HOURS PRN
Status: DISCONTINUED | OUTPATIENT
Start: 2023-11-27 | End: 2023-11-28 | Stop reason: HOSPADM

## 2023-11-27 RX ORDER — NIFEDIPINE 10 MG/1
10 CAPSULE ORAL ONCE AS NEEDED
Status: DISCONTINUED | OUTPATIENT
Start: 2023-11-27 | End: 2023-11-28 | Stop reason: HOSPADM

## 2023-11-27 RX ORDER — LIDOCAINE HYDROCHLORIDE 10 MG/ML
0.5 INJECTION INFILTRATION; PERINEURAL ONCE AS NEEDED
Status: DISCONTINUED | OUTPATIENT
Start: 2023-11-27 | End: 2023-11-28 | Stop reason: HOSPADM

## 2023-11-27 RX ORDER — ONDANSETRON 4 MG/1
4 TABLET, FILM COATED ORAL EVERY 6 HOURS PRN
Status: DISCONTINUED | OUTPATIENT
Start: 2023-11-27 | End: 2023-11-28 | Stop reason: HOSPADM

## 2023-11-27 RX ORDER — HYDRALAZINE HYDROCHLORIDE 20 MG/ML
5 INJECTION INTRAMUSCULAR; INTRAVENOUS ONCE AS NEEDED
Status: DISCONTINUED | OUTPATIENT
Start: 2023-11-27 | End: 2023-11-28 | Stop reason: HOSPADM

## 2023-11-27 RX ORDER — LABETALOL HYDROCHLORIDE 5 MG/ML
20 INJECTION, SOLUTION INTRAVENOUS ONCE AS NEEDED
Status: DISCONTINUED | OUTPATIENT
Start: 2023-11-27 | End: 2023-11-28 | Stop reason: HOSPADM

## 2023-11-27 SDOH — HEALTH STABILITY: MENTAL HEALTH: HAVE YOU USED ANY SUBSTANCES (CANABIS, COCAINE, HEROIN, HALLUCINOGENS, INHALANTS, ETC.) IN THE PAST 12 MONTHS?: NO

## 2023-11-27 SDOH — HEALTH STABILITY: MENTAL HEALTH: SUICIDAL BEHAVIOR (LIFETIME): NO

## 2023-11-27 SDOH — SOCIAL STABILITY: SOCIAL INSECURITY: ARE THERE ANY APPARENT SIGNS OF INJURIES/BEHAVIORS THAT COULD BE RELATED TO ABUSE/NEGLECT?: NO

## 2023-11-27 SDOH — SOCIAL STABILITY: SOCIAL INSECURITY: HAVE YOU HAD THOUGHTS OF HARMING ANYONE ELSE?: YES

## 2023-11-27 SDOH — SOCIAL STABILITY: SOCIAL INSECURITY: PHYSICAL ABUSE: DENIES

## 2023-11-27 SDOH — HEALTH STABILITY: MENTAL HEALTH: NON-SPECIFIC ACTIVE SUICIDAL THOUGHTS (PAST 1 MONTH): NO

## 2023-11-27 SDOH — SOCIAL STABILITY: SOCIAL INSECURITY: VERBAL ABUSE: DENIES

## 2023-11-27 SDOH — HEALTH STABILITY: MENTAL HEALTH: WERE YOU ABLE TO COMPLETE ALL THE BEHAVIORAL HEALTH SCREENINGS?: YES

## 2023-11-27 SDOH — HEALTH STABILITY: MENTAL HEALTH: STRENGTHS (MUST CHOOSE TWO): STABLE DOMESTIC SITUATION;SUPPORT FROM FAMILY;ADEQUATE FINANCIAL RESOURCES

## 2023-11-27 SDOH — SOCIAL STABILITY: SOCIAL INSECURITY: HAS ANYONE EVER THREATENED TO HURT YOUR FAMILY OR YOUR PETS?: NO

## 2023-11-27 SDOH — ECONOMIC STABILITY: HOUSING INSECURITY: DO YOU FEEL UNSAFE GOING BACK TO THE PLACE WHERE YOU ARE LIVING?: NO

## 2023-11-27 SDOH — SOCIAL STABILITY: SOCIAL INSECURITY: DO YOU FEEL ANYONE HAS EXPLOITED OR TAKEN ADVANTAGE OF YOU FINANCIALLY OR OF YOUR PERSONAL PROPERTY?: NO

## 2023-11-27 SDOH — SOCIAL STABILITY: SOCIAL INSECURITY: ARE YOU OR HAVE YOU BEEN THREATENED OR ABUSED PHYSICALLY, EMOTIONALLY, OR SEXUALLY BY ANYONE?: NO

## 2023-11-27 SDOH — HEALTH STABILITY: MENTAL HEALTH: HAVE YOU USED ANY PRESCRIPTION DRUGS OTHER THAN PRESCRIBED IN THE PAST 12 MONTHS?: NO

## 2023-11-27 SDOH — HEALTH STABILITY: MENTAL HEALTH: WISH TO BE DEAD (PAST 1 MONTH): NO

## 2023-11-27 SDOH — SOCIAL STABILITY: SOCIAL INSECURITY: DOES ANYONE TRY TO KEEP YOU FROM HAVING/CONTACTING OTHER FRIENDS OR DOING THINGS OUTSIDE YOUR HOME?: NO

## 2023-11-27 SDOH — SOCIAL STABILITY: SOCIAL INSECURITY: ABUSE SCREEN: ADULT

## 2023-11-27 ASSESSMENT — PATIENT HEALTH QUESTIONNAIRE - PHQ9
2. FEELING DOWN, DEPRESSED OR HOPELESS: NOT AT ALL
SUM OF ALL RESPONSES TO PHQ9 QUESTIONS 1 & 2: 0
1. LITTLE INTEREST OR PLEASURE IN DOING THINGS: NOT AT ALL

## 2023-11-27 ASSESSMENT — PAIN SCALES - GENERAL
PAINLEVEL: 0 - NO PAIN
PAINLEVEL_OUTOF10: 0 - NO PAIN
PAINLEVEL_OUTOF10: 0 - NO PAIN

## 2023-11-27 ASSESSMENT — LIFESTYLE VARIABLES
HOW MANY STANDARD DRINKS CONTAINING ALCOHOL DO YOU HAVE ON A TYPICAL DAY: PATIENT DOES NOT DRINK
AUDIT-C TOTAL SCORE: 0
HOW OFTEN DO YOU HAVE 6 OR MORE DRINKS ON ONE OCCASION: NEVER
HOW OFTEN DO YOU HAVE A DRINK CONTAINING ALCOHOL: NEVER
AUDIT-C TOTAL SCORE: 0
SKIP TO QUESTIONS 9-10: 1

## 2023-11-28 ENCOUNTER — TELEPHONE (OUTPATIENT)
Dept: OBSTETRICS AND GYNECOLOGY | Facility: CLINIC | Age: 33
End: 2023-11-28
Payer: COMMERCIAL

## 2023-11-28 ENCOUNTER — HOSPITAL ENCOUNTER (OUTPATIENT)
Facility: HOSPITAL | Age: 33
End: 2023-11-28
Attending: OBSTETRICS & GYNECOLOGY | Admitting: OBSTETRICS & GYNECOLOGY
Payer: COMMERCIAL

## 2023-11-28 LAB
FLUAV RNA RESP QL NAA+PROBE: NOT DETECTED
FLUBV RNA RESP QL NAA+PROBE: NOT DETECTED
SARS-COV-2 RNA RESP QL NAA+PROBE: NOT DETECTED

## 2023-11-28 NOTE — H&P
Triage H&P     Ellen Crawley is a 32 y.o.  at 25w0d. OSCAR: 3/11/2024, by LMP and 10.3 w US    Chief Complaint: Leakage/Loss of Fluid (Started today  at around 1330 )    Assessment/Plan      R/o PROM  - SSE: Pooling, nitrazine and ferning negative  - Cervix visually closed  - MARILUZ: 11 cm by BSUS, cephalic  - Urinalysis: wnl  - Pt describes loss of fluid with coughing, she has sore throat starting this morning and also describes sick contact at home  Low concern for PROM at this time with physical exam findings  - Swabs collected for Flu and Covid in the s/o cough, no fever    IUP at 25.0 wga  - Good fetal movement, no contractions, no vaginal bleeding  - Up to date on prenatal care  - Plasenta previa The placenta is implanted along the posterior uterine wall with the  placental margin covering the internal cervical os (posterior placenta previa).  - Pelvic rest discussed    Seen and d/w Dr. Desai-Edson Owens MD PGY1           Active Problems:  There are no active Hospital Problems.      Pregnancy Problems (from 23 to present)       Problem Noted Resolved    Placenta previa in second trimester 10/12/2023 by Brisa Nichols MD No    Priority:  Medium      Overview Signed 10/12/2023 12:12 PM by Brisa Nichols MD     Monitor with Ultrasounds         Fetal abnormality affecting management of mother 10/12/2023 by Brisa Nichols MD No    Priority:  Medium      Overview Addendum 2023  9:55 AM by Brisa Nichols MD     Did testing with outside lab which showed male gender. Ultrasound shows female gender. Will send for PreQuel testing.   Did see genetics for counseling on 10/11/23  PreQuel shows normal XX          Prenatal care, subsequent pregnancy in second trimester 10/11/2023 by Brisa Nichols MD No    Priority:  Medium      Overview Signed 10/11/2023  7:24 AM by Brisa Nichols MD     Declined genetics         Urinary tract infection in mother during first  trimester of pregnancy 10/11/2023 by Brisa Nichols MD No    Priority:  Medium      Overview Signed 10/11/2023  7:25 AM by Brisa Nichols MD     Treated with Macrobid         Rubella non-immune status, antepartum 10/11/2023 by Brisa Nichols MD No    Priority:  Medium      Overview Signed 10/11/2023  7:26 AM by Brisa Nichols MD     Rubella equivocal. Needs MMR postpartum.                 Subjective   Ellen presents to Labor & Delivery with concern for Spontaneous Rupture of Membranes . Good fetal movement. Denies vaginal bleeding., Denies contractions.          Obstetrical History   OB History    Para Term  AB Living   4 2 2   1 2   SAB IAB Ectopic Multiple Live Births   1       1      # Outcome Date GA Lbr Tony/2nd Weight Sex Delivery Anes PTL Lv   4 Current            3 Term 17 36w0d  3402 g M Vag-Spont None N    2 Term 14 36w0d  2778 g M Vag-Spont None N IVAN   1 SAB                Past Medical History  Past Medical History:   Diagnosis Date    Personal history of other diseases of the respiratory system 2021    History of acute bacterial sinusitis    Unspecified amblyopia, left eye     Amblyopia of left eye    Urinary tract infection         Past Surgical History   Past Surgical History:   Procedure Laterality Date    OTHER SURGICAL HISTORY  02/10/2021    Abscess incision and drainage       Social History  Social History     Tobacco Use    Smoking status: Never     Passive exposure: Never    Smokeless tobacco: Never   Substance Use Topics    Alcohol use: Not Currently     Substance and Sexual Activity   Drug Use Never       Allergies  Clindamycin     Medications  Medications Prior to Admission   Medication Sig Dispense Refill Last Dose    [] fluconazole (Diflucan) 150 mg tablet Take 1 tablet (150 mg) by mouth 1 time for 1 dose. 1 tablet 0        Objective    Last Vitals  Temp Pulse Resp BP MAP O2 Sat   36.7 °C (98.1 °F) 77 18 108/60   97 %      Physical Examination  GENERAL: Examination reveals a well developed, well nourished, gravid female in no acute distress. She is alert and cooperative.  NECK: supple, no significant adenopathy  LUNGS:  normal respiratory effort on room air  ABDOMEN: soft, gravid, nontender, nondistended, no abnormal masses, no epigastric pain  FHR baseline  is 140 , appropriate for gesational age  [place] reading:  quiet  The fetus is in a vertex presentation, determined by ultrasound MARILUZ 11 cm  CERVIX: visually closed;SSE: Pooling neg, Nitrazine neg, Ferning neg  PSYCHOLOGICAL: awake and alert; oriented to person, place, and time    Lab Review  Urinalysis wnl

## 2023-11-30 ENCOUNTER — LAB (OUTPATIENT)
Dept: LAB | Facility: LAB | Age: 33
End: 2023-11-30
Payer: COMMERCIAL

## 2023-11-30 ENCOUNTER — ROUTINE PRENATAL (OUTPATIENT)
Dept: OBSTETRICS AND GYNECOLOGY | Facility: CLINIC | Age: 33
End: 2023-11-30
Payer: COMMERCIAL

## 2023-11-30 VITALS — WEIGHT: 140 LBS | DIASTOLIC BLOOD PRESSURE: 70 MMHG | BODY MASS INDEX: 23.3 KG/M2 | SYSTOLIC BLOOD PRESSURE: 118 MMHG

## 2023-11-30 DIAGNOSIS — O35.9XX1 FETAL ABNORMALITY AFFECTING MANAGEMENT OF MOTHER, FETUS 1 OF MULTIPLE GESTATION (HHS-HCC): ICD-10-CM

## 2023-11-30 DIAGNOSIS — Z33.1 NORMAL PREGNANCY, INCIDENTAL (HHS-HCC): ICD-10-CM

## 2023-11-30 DIAGNOSIS — I86.3 VARICOSE VEINS OF VULVA AND PERINEUM: Primary | ICD-10-CM

## 2023-11-30 DIAGNOSIS — N63.23 MASS OF LOWER OUTER QUADRANT OF LEFT BREAST: ICD-10-CM

## 2023-11-30 DIAGNOSIS — Z34.82 PRENATAL CARE, SUBSEQUENT PREGNANCY IN SECOND TRIMESTER (HHS-HCC): Primary | ICD-10-CM

## 2023-11-30 DIAGNOSIS — O23.41 URINARY TRACT INFECTION IN MOTHER DURING FIRST TRIMESTER OF PREGNANCY (HHS-HCC): ICD-10-CM

## 2023-11-30 DIAGNOSIS — I86.3 VARICOSE VEINS OF VULVA AND PERINEUM: ICD-10-CM

## 2023-11-30 DIAGNOSIS — Z34.82 PRENATAL CARE, SUBSEQUENT PREGNANCY IN SECOND TRIMESTER (HHS-HCC): ICD-10-CM

## 2023-11-30 DIAGNOSIS — O22.02: Primary | ICD-10-CM

## 2023-11-30 DIAGNOSIS — O44.02 PLACENTA PREVIA IN SECOND TRIMESTER (HHS-HCC): ICD-10-CM

## 2023-11-30 LAB
ERYTHROCYTE [DISTWIDTH] IN BLOOD BY AUTOMATED COUNT: 13.7 % (ref 11.5–14.5)
GLUCOSE 1H P 50 G GLC PO SERPL-MCNC: 80 MG/DL
HCT VFR BLD AUTO: 36 % (ref 36–46)
HGB BLD-MCNC: 11.8 G/DL (ref 12–16)
MCH RBC QN AUTO: 31.5 PG (ref 26–34)
MCHC RBC AUTO-ENTMCNC: 32.8 G/DL (ref 32–36)
MCV RBC AUTO: 96 FL (ref 80–100)
NRBC BLD-RTO: 0 /100 WBCS (ref 0–0)
PLATELET # BLD AUTO: 160 X10*3/UL (ref 150–450)
RBC # BLD AUTO: 3.75 X10*6/UL (ref 4–5.2)
T PALLIDUM AB SER QL: NONREACTIVE
WBC # BLD AUTO: 7.6 X10*3/UL (ref 4.4–11.3)

## 2023-11-30 PROCEDURE — 86780 TREPONEMA PALLIDUM: CPT

## 2023-11-30 PROCEDURE — 85027 COMPLETE CBC AUTOMATED: CPT

## 2023-11-30 PROCEDURE — 82947 ASSAY GLUCOSE BLOOD QUANT: CPT

## 2023-11-30 PROCEDURE — 99214 OFFICE O/P EST MOD 30 MIN: CPT | Performed by: OBSTETRICS & GYNECOLOGY

## 2023-11-30 PROCEDURE — 36415 COLL VENOUS BLD VENIPUNCTURE: CPT

## 2023-11-30 RX ORDER — COMPRESS.STOCKING,KNEE,REG,MED
EACH MISCELLANEOUS
Qty: 1 EACH | Refills: 0 | Status: SHIPPED | OUTPATIENT
Start: 2023-11-30

## 2023-11-30 NOTE — PROGRESS NOTES
25.3 week OB visit.   Seen in triage for LOF- appeared to be loss of urine. Happened with a cough.  Having discomfort from her varicose veins. Will order support hose.   Gets pressure in her vagina and legs.   Doing glucola today.   Marginal previa- no bleeding.   Ok for prenatal massage.     Problem List Items Addressed This Visit       Prenatal care, subsequent pregnancy in second trimester - Primary    Overview     Declined genetics         Relevant Orders    CBC Anemia Panel With Reflex, Pregnancy    Syphilis Screen with Reflex    Urinary tract infection in mother during first trimester of pregnancy    Overview     Treated with Macrobid         Placenta previa in second trimester    Overview     Monitor with Ultrasounds  Next USN 12/29         Fetal abnormality affecting management of mother    Overview     Did testing with outside lab which showed male gender. Ultrasound shows female gender. Will send for PreQuel testing.   Did see genetics for counseling on 10/11/23  PreQuel shows normal XX           Other Visit Diagnoses       Normal pregnancy, incidental        Relevant Orders    Glucose, 1 Hour Screen, Pregnancy    Mass of lower outer quadrant of left breast        Relevant Orders    BI US breast complete left

## 2023-12-01 LAB — REFLEX ADDED, ANEMIA PANEL: NORMAL

## 2023-12-06 ENCOUNTER — TELEPHONE (OUTPATIENT)
Dept: OBSTETRICS AND GYNECOLOGY | Facility: CLINIC | Age: 33
End: 2023-12-06
Payer: COMMERCIAL

## 2023-12-06 NOTE — TELEPHONE ENCOUNTER
Faxed over rx to [facility] for pt the original seems to have been printed instead of electronically being sent over . She will need to make appt with home care to get fitted for compression stockings [phone] with Tanisha or Blanche per pharmacy.   ROOM CHECK DONE. INFANT UP IN MOM'S ARMS. BLOOD DRAWN FROM LEFT HEEL FOR D.STX
OF 38. FORMULA BOTTLE HANDED TO MOM AND INSTRUCTED HER TO FEED INFANT 30 ML
FORMULA OR MORE. MOM STATES UNDERSTANDING.

## 2023-12-07 NOTE — PROGRESS NOTES
Form faxed to [facility] today for breast pump, back support and compression stockings [phone]

## 2023-12-14 ENCOUNTER — ANCILLARY PROCEDURE (OUTPATIENT)
Dept: RADIOLOGY | Facility: CLINIC | Age: 33
End: 2023-12-14
Payer: COMMERCIAL

## 2023-12-14 ENCOUNTER — TELEPHONE (OUTPATIENT)
Dept: OBSTETRICS AND GYNECOLOGY | Facility: CLINIC | Age: 33
End: 2023-12-14
Payer: COMMERCIAL

## 2023-12-14 DIAGNOSIS — N64.4: ICD-10-CM

## 2023-12-14 DIAGNOSIS — O92.29: ICD-10-CM

## 2023-12-14 DIAGNOSIS — N63.23 MASS OF LOWER OUTER QUADRANT OF LEFT BREAST: ICD-10-CM

## 2023-12-14 PROCEDURE — 76982 USE 1ST TARGET LESION: CPT | Mod: LT

## 2023-12-14 PROCEDURE — 76641 ULTRASOUND BREAST COMPLETE: CPT | Mod: LT

## 2023-12-14 PROCEDURE — 76642 ULTRASOUND BREAST LIMITED: CPT | Mod: LEFT SIDE | Performed by: RADIOLOGY

## 2023-12-14 PROCEDURE — 76642 ULTRASOUND BREAST LIMITED: CPT | Mod: LT

## 2023-12-14 NOTE — TELEPHONE ENCOUNTER
----- Message from Shakira Mayberry RN sent at 12/14/2023 12:37 PM EST -----    ----- Message -----  From: Brisa Nichols MD  Sent: 12/14/2023  12:29 PM EST  To: Marcio Mack Clinical Support Staff

## 2023-12-15 ENCOUNTER — TELEPHONE (OUTPATIENT)
Dept: OBSTETRICS AND GYNECOLOGY | Facility: CLINIC | Age: 33
End: 2023-12-15
Payer: COMMERCIAL

## 2023-12-15 NOTE — TELEPHONE ENCOUNTER
Called pt, no answer, left voicemail for return call, will await pt's call back  Re:normal results

## 2023-12-22 ENCOUNTER — TELEPHONE (OUTPATIENT)
Dept: OBSTETRICS AND GYNECOLOGY | Facility: CLINIC | Age: 33
End: 2023-12-22
Payer: COMMERCIAL

## 2023-12-28 ENCOUNTER — ROUTINE PRENATAL (OUTPATIENT)
Dept: OBSTETRICS AND GYNECOLOGY | Facility: CLINIC | Age: 33
End: 2023-12-28
Payer: COMMERCIAL

## 2023-12-28 VITALS — SYSTOLIC BLOOD PRESSURE: 122 MMHG | DIASTOLIC BLOOD PRESSURE: 70 MMHG | WEIGHT: 145 LBS | BODY MASS INDEX: 24.13 KG/M2

## 2023-12-28 DIAGNOSIS — Z23 NEED FOR TDAP VACCINATION: Primary | ICD-10-CM

## 2023-12-28 DIAGNOSIS — Z34.82 PRENATAL CARE, SUBSEQUENT PREGNANCY IN SECOND TRIMESTER (HHS-HCC): ICD-10-CM

## 2023-12-28 DIAGNOSIS — O44.02 PLACENTA PREVIA IN SECOND TRIMESTER (HHS-HCC): ICD-10-CM

## 2023-12-28 DIAGNOSIS — Z28.39 RUBELLA NON-IMMUNE STATUS, ANTEPARTUM (HHS-HCC): ICD-10-CM

## 2023-12-28 DIAGNOSIS — O35.9XX1 FETAL ABNORMALITY AFFECTING MANAGEMENT OF MOTHER, FETUS 1 OF MULTIPLE GESTATION (HHS-HCC): ICD-10-CM

## 2023-12-28 DIAGNOSIS — O23.41 URINARY TRACT INFECTION IN MOTHER DURING FIRST TRIMESTER OF PREGNANCY (HHS-HCC): ICD-10-CM

## 2023-12-28 DIAGNOSIS — O09.899 RUBELLA NON-IMMUNE STATUS, ANTEPARTUM (HHS-HCC): ICD-10-CM

## 2023-12-28 PROCEDURE — 90715 TDAP VACCINE 7 YRS/> IM: CPT | Performed by: OBSTETRICS & GYNECOLOGY

## 2023-12-28 PROCEDURE — 90471 IMMUNIZATION ADMIN: CPT | Performed by: OBSTETRICS & GYNECOLOGY

## 2023-12-28 PROCEDURE — 99213 OFFICE O/P EST LOW 20 MIN: CPT | Performed by: OBSTETRICS & GYNECOLOGY
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
## 2023-12-28 NOTE — PROGRESS NOTES
29.3 week Ob visit.   Having some uterine cramping. No bleeding.,   Has follow-up ultrasound for previa tomorrow.   Tdap today. Discussed RSV vaccine.     Problem List Items Addressed This Visit       Prenatal care, subsequent pregnancy in second trimester    Overview     Declined genetics  Hx PP hemorrhage.          Urinary tract infection in mother during first trimester of pregnancy    Overview     Treated with Macrobid         Rubella non-immune status, antepartum    Overview     Rubella equivocal. Needs MMR postpartum.         Placenta previa in second trimester    Overview     Monitor with Ultrasounds  Next USN 12/29         Fetal abnormality affecting management of mother    Overview     Did testing with outside lab which showed male gender. Ultrasound shows female gender. Will send for PreQuel testing.   Did see genetics for counseling on 10/11/23  PreQuel shows normal XX           Other Visit Diagnoses       Need for Tdap vaccination    -  Primary

## 2023-12-29 ENCOUNTER — ANCILLARY PROCEDURE (OUTPATIENT)
Dept: RADIOLOGY | Facility: CLINIC | Age: 33
End: 2023-12-29
Payer: COMMERCIAL

## 2023-12-29 DIAGNOSIS — Z3A.30 30 WEEKS GESTATION OF PREGNANCY (HHS-HCC): ICD-10-CM

## 2023-12-29 DIAGNOSIS — Z87.59 H/O PLACENTA PREVIA: ICD-10-CM

## 2023-12-29 PROCEDURE — 76819 FETAL BIOPHYS PROFIL W/O NST: CPT | Performed by: STUDENT IN AN ORGANIZED HEALTH CARE EDUCATION/TRAINING PROGRAM

## 2023-12-29 PROCEDURE — 76816 OB US FOLLOW-UP PER FETUS: CPT | Performed by: STUDENT IN AN ORGANIZED HEALTH CARE EDUCATION/TRAINING PROGRAM

## 2023-12-29 PROCEDURE — 76817 TRANSVAGINAL US OBSTETRIC: CPT

## 2023-12-29 PROCEDURE — 76816 OB US FOLLOW-UP PER FETUS: CPT

## 2023-12-29 PROCEDURE — 76817 TRANSVAGINAL US OBSTETRIC: CPT | Performed by: STUDENT IN AN ORGANIZED HEALTH CARE EDUCATION/TRAINING PROGRAM

## 2024-01-02 ENCOUNTER — TELEPHONE (OUTPATIENT)
Dept: MATERNAL FETAL MEDICINE | Facility: HOSPITAL | Age: 34
End: 2024-01-02
Payer: COMMERCIAL

## 2024-01-02 ENCOUNTER — DOCUMENTATION (OUTPATIENT)
Dept: OBSTETRICS AND GYNECOLOGY | Facility: CLINIC | Age: 34
End: 2024-01-02
Payer: COMMERCIAL

## 2024-01-02 NOTE — TELEPHONE ENCOUNTER
Attempted to call patient to discuss recent ultrasound results. Left voicemail with office's call back number to further discuss.  Glenna GOTTLIEB, RN, CLC     ----- Message from Brisa Nichols MD sent at 12/31/2023  6:44 PM EST -----  The baby is growing well. The placenta has moved, but is not completely away from the cervix yet. We will see what the next ultrasound shows.

## 2024-01-02 NOTE — PROGRESS NOTES
contacted pt  name and  verified  Spoke with pt, gave pt message from Dr. Nichols listed below:  The baby is growing well. The placenta has moved, but is not completely away from the cervix yet. We will see what the next ultrasound shows.    pt verbalized understanding  no further questions or concerns at this time

## 2024-01-04 ENCOUNTER — HOSPITAL ENCOUNTER (OUTPATIENT)
Facility: HOSPITAL | Age: 34
Setting detail: OBSERVATION
Discharge: HOME | End: 2024-01-05
Attending: OBSTETRICS & GYNECOLOGY
Payer: COMMERCIAL

## 2024-01-04 PROBLEM — N93.9 VAGINAL SPOTTING: Status: ACTIVE | Noted: 2024-01-04

## 2024-01-04 LAB
ABO GROUP (TYPE) IN BLOOD: NORMAL
ANTIBODY SCREEN: NORMAL
APTT PPP: 31 SECONDS (ref 27–38)
BILIRUBIN, POC: NEGATIVE
BLOOD URINE, POC: POSITIVE
CLARITY, POC: CLEAR
CLUE CELLS SPEC QL WET PREP: NORMAL
COLOR, POC: YELLOW
ERYTHROCYTE [DISTWIDTH] IN BLOOD BY AUTOMATED COUNT: 13.7 % (ref 11.5–14.5)
FIBRINOGEN PPP-MCNC: 539 MG/DL (ref 200–400)
GLUCOSE URINE, POC: NEGATIVE
HCT VFR BLD AUTO: 36.8 % (ref 36–46)
HGB BLD-MCNC: 12.5 G/DL (ref 12–16)
INR PPP: 1 (ref 0.9–1.1)
KETONES, POC: NEGATIVE
LEUKOCYTE EST, POC: NORMAL
MCH RBC QN AUTO: 32.1 PG (ref 26–34)
MCHC RBC AUTO-ENTMCNC: 34 G/DL (ref 32–36)
MCV RBC AUTO: 95 FL (ref 80–100)
NITRITE, POC: NEGATIVE
NRBC BLD-RTO: 0 /100 WBCS (ref 0–0)
PH, POC: 6
PLATELET # BLD AUTO: 163 X10*3/UL (ref 150–450)
PROTHROMBIN TIME: 10.8 SECONDS (ref 9.8–12.8)
RBC # BLD AUTO: 3.89 X10*6/UL (ref 4–5.2)
RH FACTOR (ANTIGEN D): NORMAL
SPECIFIC GRAVITY, POC: 1.02
T VAGINALIS SPEC QL WET PREP: NORMAL
TRICHOMONAS REFLEX COMMENT: NORMAL
URINE PROTEIN, POC: NEGATIVE
UROBILINOGEN, POC: 0.2
WBC # BLD AUTO: 10.3 X10*3/UL (ref 4.4–11.3)
WBC VAG QL WET PREP: NORMAL
YEAST VAG QL WET PREP: NORMAL

## 2024-01-04 PROCEDURE — 87081 CULTURE SCREEN ONLY: CPT | Mod: 59

## 2024-01-04 PROCEDURE — 86901 BLOOD TYPING SEROLOGIC RH(D): CPT

## 2024-01-04 PROCEDURE — 87210 SMEAR WET MOUNT SALINE/INK: CPT

## 2024-01-04 PROCEDURE — 87086 URINE CULTURE/COLONY COUNT: CPT

## 2024-01-04 PROCEDURE — G0378 HOSPITAL OBSERVATION PER HR: HCPCS

## 2024-01-04 PROCEDURE — 36415 COLL VENOUS BLD VENIPUNCTURE: CPT

## 2024-01-04 PROCEDURE — 85027 COMPLETE CBC AUTOMATED: CPT

## 2024-01-04 PROCEDURE — 59025 FETAL NON-STRESS TEST: CPT

## 2024-01-04 PROCEDURE — 59025 FETAL NON-STRESS TEST: CPT | Mod: GC

## 2024-01-04 PROCEDURE — 99222 1ST HOSP IP/OBS MODERATE 55: CPT

## 2024-01-04 PROCEDURE — 85384 FIBRINOGEN ACTIVITY: CPT

## 2024-01-04 PROCEDURE — 81002 URINALYSIS NONAUTO W/O SCOPE: CPT

## 2024-01-04 PROCEDURE — 85610 PROTHROMBIN TIME: CPT

## 2024-01-04 RX ORDER — SODIUM CHLORIDE, SODIUM LACTATE, POTASSIUM CHLORIDE, CALCIUM CHLORIDE 600; 310; 30; 20 MG/100ML; MG/100ML; MG/100ML; MG/100ML
125 INJECTION, SOLUTION INTRAVENOUS CONTINUOUS
Status: DISCONTINUED | OUTPATIENT
Start: 2024-01-04 | End: 2024-01-05 | Stop reason: HOSPADM

## 2024-01-04 RX ORDER — METOCLOPRAMIDE 10 MG/1
10 TABLET ORAL EVERY 6 HOURS PRN
Status: DISCONTINUED | OUTPATIENT
Start: 2024-01-04 | End: 2024-01-05 | Stop reason: HOSPADM

## 2024-01-04 RX ORDER — ONDANSETRON 4 MG/1
4 TABLET, FILM COATED ORAL EVERY 6 HOURS PRN
Status: DISCONTINUED | OUTPATIENT
Start: 2024-01-04 | End: 2024-01-05 | Stop reason: HOSPADM

## 2024-01-04 RX ORDER — BISACODYL 10 MG/1
10 SUPPOSITORY RECTAL DAILY PRN
Status: DISCONTINUED | OUTPATIENT
Start: 2024-01-04 | End: 2024-01-05 | Stop reason: HOSPADM

## 2024-01-04 RX ORDER — ONDANSETRON HYDROCHLORIDE 2 MG/ML
4 INJECTION, SOLUTION INTRAVENOUS EVERY 6 HOURS PRN
Status: DISCONTINUED | OUTPATIENT
Start: 2024-01-04 | End: 2024-01-04

## 2024-01-04 RX ORDER — ONDANSETRON 4 MG/1
4 TABLET, FILM COATED ORAL EVERY 6 HOURS PRN
Status: DISCONTINUED | OUTPATIENT
Start: 2024-01-04 | End: 2024-01-04

## 2024-01-04 RX ORDER — METOCLOPRAMIDE HYDROCHLORIDE 5 MG/ML
10 INJECTION INTRAMUSCULAR; INTRAVENOUS EVERY 6 HOURS PRN
Status: DISCONTINUED | OUTPATIENT
Start: 2024-01-04 | End: 2024-01-05 | Stop reason: HOSPADM

## 2024-01-04 RX ORDER — LIDOCAINE HYDROCHLORIDE 10 MG/ML
0.5 INJECTION INFILTRATION; PERINEURAL ONCE AS NEEDED
Status: DISCONTINUED | OUTPATIENT
Start: 2024-01-04 | End: 2024-01-05 | Stop reason: HOSPADM

## 2024-01-04 RX ORDER — ONDANSETRON HYDROCHLORIDE 2 MG/ML
4 INJECTION, SOLUTION INTRAVENOUS EVERY 6 HOURS PRN
Status: DISCONTINUED | OUTPATIENT
Start: 2024-01-04 | End: 2024-01-05 | Stop reason: HOSPADM

## 2024-01-04 RX ORDER — POLYETHYLENE GLYCOL 3350 17 G/17G
17 POWDER, FOR SOLUTION ORAL 2 TIMES DAILY PRN
Status: DISCONTINUED | OUTPATIENT
Start: 2024-01-04 | End: 2024-01-05 | Stop reason: HOSPADM

## 2024-01-04 RX ORDER — SIMETHICONE 80 MG
80 TABLET,CHEWABLE ORAL 4 TIMES DAILY PRN
Status: DISCONTINUED | OUTPATIENT
Start: 2024-01-04 | End: 2024-01-05 | Stop reason: HOSPADM

## 2024-01-04 RX ORDER — ADHESIVE BANDAGE
10 BANDAGE TOPICAL
Status: DISCONTINUED | OUTPATIENT
Start: 2024-01-04 | End: 2024-01-05 | Stop reason: HOSPADM

## 2024-01-04 SDOH — HEALTH STABILITY: MENTAL HEALTH: WISH TO BE DEAD (PAST 1 MONTH): NO

## 2024-01-04 SDOH — HEALTH STABILITY: MENTAL HEALTH: HAVE YOU USED ANY PRESCRIPTION DRUGS OTHER THAN PRESCRIBED IN THE PAST 12 MONTHS?: NO

## 2024-01-04 SDOH — HEALTH STABILITY: MENTAL HEALTH: SUICIDAL BEHAVIOR (LIFETIME): NO

## 2024-01-04 SDOH — ECONOMIC STABILITY: HOUSING INSECURITY: DO YOU FEEL UNSAFE GOING BACK TO THE PLACE WHERE YOU ARE LIVING?: NO

## 2024-01-04 SDOH — SOCIAL STABILITY: SOCIAL INSECURITY: DO YOU FEEL ANYONE HAS EXPLOITED OR TAKEN ADVANTAGE OF YOU FINANCIALLY OR OF YOUR PERSONAL PROPERTY?: NO

## 2024-01-04 SDOH — HEALTH STABILITY: MENTAL HEALTH: HAVE YOU USED ANY SUBSTANCES (CANABIS, COCAINE, HEROIN, HALLUCINOGENS, INHALANTS, ETC.) IN THE PAST 12 MONTHS?: NO

## 2024-01-04 SDOH — SOCIAL STABILITY: SOCIAL INSECURITY: ARE YOU OR HAVE YOU BEEN THREATENED OR ABUSED PHYSICALLY, EMOTIONALLY, OR SEXUALLY BY ANYONE?: NO

## 2024-01-04 SDOH — SOCIAL STABILITY: SOCIAL INSECURITY: PHYSICAL ABUSE: DENIES

## 2024-01-04 SDOH — SOCIAL STABILITY: SOCIAL INSECURITY: DOES ANYONE TRY TO KEEP YOU FROM HAVING/CONTACTING OTHER FRIENDS OR DOING THINGS OUTSIDE YOUR HOME?: NO

## 2024-01-04 SDOH — SOCIAL STABILITY: SOCIAL INSECURITY: ABUSE SCREEN: ADULT

## 2024-01-04 SDOH — HEALTH STABILITY: MENTAL HEALTH: NON-SPECIFIC ACTIVE SUICIDAL THOUGHTS (PAST 1 MONTH): NO

## 2024-01-04 SDOH — HEALTH STABILITY: MENTAL HEALTH: STRENGTHS (MUST CHOOSE TWO): SUPPORT FROM FAMILY;SUPPORT FROM FRIENDS

## 2024-01-04 SDOH — SOCIAL STABILITY: SOCIAL INSECURITY: ARE THERE ANY APPARENT SIGNS OF INJURIES/BEHAVIORS THAT COULD BE RELATED TO ABUSE/NEGLECT?: NO

## 2024-01-04 SDOH — SOCIAL STABILITY: SOCIAL INSECURITY: VERBAL ABUSE: DENIES

## 2024-01-04 SDOH — SOCIAL STABILITY: SOCIAL INSECURITY: HAS ANYONE EVER THREATENED TO HURT YOUR FAMILY OR YOUR PETS?: NO

## 2024-01-04 SDOH — HEALTH STABILITY: MENTAL HEALTH: WERE YOU ABLE TO COMPLETE ALL THE BEHAVIORAL HEALTH SCREENINGS?: YES

## 2024-01-04 SDOH — SOCIAL STABILITY: SOCIAL INSECURITY: HAVE YOU HAD THOUGHTS OF HARMING ANYONE ELSE?: NO

## 2024-01-04 ASSESSMENT — PAIN SCALES - GENERAL
PAINLEVEL_OUTOF10: 0 - NO PAIN

## 2024-01-04 ASSESSMENT — PATIENT HEALTH QUESTIONNAIRE - PHQ9
SUM OF ALL RESPONSES TO PHQ9 QUESTIONS 1 & 2: 0
1. LITTLE INTEREST OR PLEASURE IN DOING THINGS: NOT AT ALL
2. FEELING DOWN, DEPRESSED OR HOPELESS: NOT AT ALL

## 2024-01-04 ASSESSMENT — LIFESTYLE VARIABLES
HOW MANY STANDARD DRINKS CONTAINING ALCOHOL DO YOU HAVE ON A TYPICAL DAY: PATIENT DOES NOT DRINK
HOW OFTEN DO YOU HAVE 6 OR MORE DRINKS ON ONE OCCASION: NEVER
AUDIT-C TOTAL SCORE: 0
HOW OFTEN DO YOU HAVE A DRINK CONTAINING ALCOHOL: NEVER
SKIP TO QUESTIONS 9-10: 1
AUDIT-C TOTAL SCORE: 0

## 2024-01-04 ASSESSMENT — PAIN - FUNCTIONAL ASSESSMENT
PAIN_FUNCTIONAL_ASSESSMENT: 0-10

## 2024-01-04 NOTE — TELEPHONE ENCOUNTER
contacted pt  name and  verified  Spoke with pt she states that she is having brown discharge when she wipes no odor  Pt has a known previa had recent ultrasound 23 next ultrasound 2924  She is leaking urine but cannot urinate  Baby is moving fine, but she is having cramping/contractions  Spoke with Dr. Nichols about pt signs and symptoms  Pt advised to go to [facility] L[facility] for Triage  pt verbalized understanding  no further questions or concerns at this time    Called L&D [phone] spoke with Latha made aware that pt was coming in for evaluation

## 2024-01-04 NOTE — H&P
Obstetrical Admission History and Physical     Ellen Crawley is a 33 y.o.  at 30w3d. OSCAR: 3/11/2024, by Last Menstrual Period. Estimated fetal weight: 1371g. She has had prenatal care with Dr. Nichols .    Chief Complaint: Contractions, Vaginal Bleeding (brown), and Leakage/Loss of Fluid    Assessment/Plan    Vaginal Spotting w/ known placenta previa  - Light brown tinted discharge on wiping today ()  - No active bleeding from os, no red blood or clots present in vault  - Cervix: visually closed  - Cat I tracing  - [place]: quiet, pt reporting intermittent cramping  - RH: A+  - Defer betamethasone and magnesium for neuro protection at this time given stable exam, reassess need if bleeding begins  - Wet prep negative  - Urine culture pending  - Baseline coags and fibrinogen pending on admission  - Routine admission labs   - MFM consult placed and aware of admission  - Needs consents    R/o PPROM  - Negative for pooling, nitrazine, and ferning  - No evidence of ROM at this time    IUP at 30w3d   - NST appropriate for gestational age   - Daily NSTs, qshift FHT  - GBS collected, no PCN allergy  - PNL reviewed, WNL, and up to date     Maternal Well-being  - Vital signs stable and WNL  - All questions and concerns addressed     Admission discussed with Dr. Acharya/Dr. Romeo.  MD team for further management.     CHEL Tilley-CNP     Principal Problem:    Vaginal spotting      Pregnancy Problems (from 23 to present)       Problem Noted Resolved    Placenta previa in second trimester 10/12/2023 by Brisa Nichols MD No    Priority:  Medium      Overview Addendum 2023  9:49 AM by Brisa Nichols MD     Monitor with Ultrasounds  Next USN          Fetal abnormality affecting management of mother 10/12/2023 by Brisa Nichols MD No    Priority:  Medium      Overview Addendum 2023  9:55 AM by Brisa Nichols MD     Did testing with outside lab which showed  "male gender. Ultrasound shows female gender. Will send for PreQuel testing.   Did see genetics for counseling on 10/11/23  PreQuel shows normal XX          Prenatal care, subsequent pregnancy in second trimester 10/11/2023 by Brisa Nichols MD No    Priority:  Medium      Overview Addendum 12/28/2023 10:25 AM by Brisa Nichols MD     Declined genetics  Hx PP hemorrhage.          Urinary tract infection in mother during first trimester of pregnancy 10/11/2023 by Brisa Nichols MD No    Priority:  Medium      Overview Signed 10/11/2023  7:25 AM by Brisa Nichols MD     Treated with Macrobid         Rubella non-immune status, antepartum 10/11/2023 by Brisa Nichols MD No    Priority:  Medium      Overview Signed 10/11/2023  7:26 AM by Brisa Nichols MD     Rubella equivocal. Needs MMR postpartum.               Subjective   Ellen is here for new onset light brown discharge.  Patient states she has felt \"off\" the last several days.  Began to cramp intermittently yesterday.  Reports a few kim allred contractions today that she described as tightening.  Reports using the restroom and noticing light brown discharge when wiping.  She has a known placenta previa.  Denies menstrual like bleeding, dark brown blood, or passage of clots.  Reports some vaginitis symptoms that have been persistent for the last few weeks.  States she has used an OTC yeast cream with little relief.  She states she has pressure when urinating.  Reports a UTI at the beginning of her pregnancy, but states symptoms feel different than previously.  Having some LOF.  States the fluid is thinner than normal.  Does not believe it is urine.  No large gush.  Trickles occasionally.  Denies contractions and reports good fetal movement.    Pregnancy notable for:  - Posterior placenta previa, marginal with edge at the cervical os  - Hx of PTD x2 @ 36wks (spontaneous and one for oligo)  - Anxiety, no meds  - UTI, VINOD " neg  - Hx of PPH  - Rubella non-immune, offer PP       Obstetrical History   OB History    Para Term  AB Living   4 2 2   1 2   SAB IAB Ectopic Multiple Live Births   1       1      # Outcome Date GA Lbr Tony/2nd Weight Sex Delivery Anes PTL Lv   4 Current            3 Term 17 36w0d  3402 g M Vag-Spont None N    2 Term 14 36w0d  2778 g M Vag-Spont None N IVAN   1 SAB                Past Medical History  Past Medical History:   Diagnosis Date    Personal history of other diseases of the respiratory system 2021    History of acute bacterial sinusitis    Unspecified amblyopia, left eye     Amblyopia of left eye    Urinary tract infection         Past Surgical History   Past Surgical History:   Procedure Laterality Date    OTHER SURGICAL HISTORY  02/10/2021    Abscess incision and drainage       Social History  Social History     Tobacco Use    Smoking status: Never     Passive exposure: Never    Smokeless tobacco: Never   Substance Use Topics    Alcohol use: Not Currently     Substance and Sexual Activity   Drug Use Never       Allergies  Clindamycin     Medications  Medications Prior to Admission   Medication Sig Dispense Refill Last Dose    compress.stocking,knee,reg,med ([place] Anti-Embolism Stocking) misc 1 PAIR OF JOBST Anti-embolism stockings  medium 1 each 0        Objective    Last Vitals  Temp Pulse Resp BP MAP O2 Sat   36.4 °C (97.5 °F) 82 18 107/69   100 %     Physical Examination  Physical Exam  Exam conducted with a chaperone present.   Constitutional:       Appearance: Normal appearance.   HENT:      Head: Normocephalic.   Cardiovascular:      Rate and Rhythm: Normal rate.   Pulmonary:      Effort: Pulmonary effort is normal.   Abdominal:      Comments: Gravid   Genitourinary:     Comments: SSE:  - Cervix visualized and visually not dilated  - Thick light brown discharge present in vault  - No active bleeding from os  - No dark blood or clots present  - Neg for pooling,  ferning, and nitrazine  - Wet prep collected    FHT: 135, mod variability, +accels, -decels   [place]: quiet  Musculoskeletal:         General: Normal range of motion.   Skin:     General: Skin is warm.   Neurological:      Mental Status: She is alert and oriented to person, place, and time.   Psychiatric:         Mood and Affect: Mood normal.         Behavior: Behavior normal.        Lab Review  Labs in chart were reviewed.

## 2024-01-05 ENCOUNTER — HOSPITAL ENCOUNTER (OUTPATIENT)
Facility: HOSPITAL | Age: 34
Setting detail: OBSERVATION
End: 2024-01-05
Payer: COMMERCIAL

## 2024-01-05 ENCOUNTER — TELEPHONE (OUTPATIENT)
Dept: OBSTETRICS AND GYNECOLOGY | Facility: CLINIC | Age: 34
End: 2024-01-05
Payer: COMMERCIAL

## 2024-01-05 VITALS
TEMPERATURE: 97.3 F | SYSTOLIC BLOOD PRESSURE: 103 MMHG | HEART RATE: 80 BPM | OXYGEN SATURATION: 96 % | HEIGHT: 65 IN | RESPIRATION RATE: 18 BRPM | DIASTOLIC BLOOD PRESSURE: 62 MMHG | WEIGHT: 144.84 LBS | BODY MASS INDEX: 24.13 KG/M2

## 2024-01-05 LAB — BACTERIA UR CULT: NORMAL

## 2024-01-05 PROCEDURE — 99238 HOSP IP/OBS DSCHRG MGMT 30/<: CPT

## 2024-01-05 PROCEDURE — G0378 HOSPITAL OBSERVATION PER HR: HCPCS

## 2024-01-05 PROCEDURE — 2500000001 HC RX 250 WO HCPCS SELF ADMINISTERED DRUGS (ALT 637 FOR MEDICARE OP)

## 2024-01-05 RX ADMIN — PRENATAL VIT W/ FE FUMARATE-FA TAB 27-0.8 MG 1 TABLET: 27-0.8 TAB at 09:10

## 2024-01-05 ASSESSMENT — PAIN SCALES - GENERAL: PAINLEVEL_OUTOF10: 0 - NO PAIN

## 2024-01-05 ASSESSMENT — ACTIVITIES OF DAILY LIVING (ADL): LACK_OF_TRANSPORTATION: NO

## 2024-01-05 ASSESSMENT — PAIN - FUNCTIONAL ASSESSMENT: PAIN_FUNCTIONAL_ASSESSMENT: 0-10

## 2024-01-05 NOTE — DISCHARGE SUMMARY
Discharge Summary    Admission Date: 2024  Discharge Date: 2024    Discharge Diagnosis  Vaginal spotting    Hospital Course    Ellen Crawley is a 33 y.o.  @ 30w4d who was admitted to the antepartum service for vaginal bleeding in the setting of a known placenta previa. Patient reported one episode of brown spotting the morning of  and had one additional episode of brown spotting during her overnight stay. Patient denied experiencing bright red bleeding or passage of clots. Lab work, including coagulation screen and fibrinogen were wnl. Hgb remained at patient's baseline at 12.5. Fetal testing remained reassuring throughout admission. By HD#2, patient was stable for discharge and strongly desiring to return home. She was discharged home in stable condition with strict return precautions and travel restrictions given desire for upcoming air travel. She is scheduled for a follow-up appointment with Dr. Nichols.      Pertinent Physical Exam At Time of Discharge  Genitourinary:      SSE () - No pooling or active bleeding      SVE (): deferred given previa   Cardiovascular:      Rate and Rhythm: Normal rate.   Pulmonary:      Effort: Pulmonary effort is normal.      Breath sounds: Normal breath sounds.   Abdominal:      Palpations: Abdomen is soft.      Comments: Gravid, non-tender to palpation  Neurological:      General: No focal deficit present.      Mental Status: She is alert.   Skin:     General: Skin is warm and dry.   Psychiatric:         Mood and Affect: Mood normal.         Behavior: Behavior normal.         Discharge Meds     Your medication list        CONTINUE taking these medications        Instructions Last Dose Given Next Dose Due   Jobst Anti-Embolism Stocking misc  Generic drug: compress.stocking,knee,reg,med      1 PAIR OF JOBST Anti-embolism stockings  medium                 Complications Requiring Follow-Up  None.    Test Results Pending At Discharge  Pending Labs       Order  Current Status    Group B Streptococcus (GBS) Prenatal Screen, Culture In process    Trichomonas vaginalis, Nucleic Acid Detection In process            Outpatient Follow-Up  Future Appointments   Date Time Provider Department Center   1/11/2024  9:30 AM MD Lilian [id number] [facility]   1/25/2024  9:00 AM MD Henrique Baltazar [facility]   1/29/2024 10:00 AM MAC [id number] ULTRASOUND 4 [id number] [facility]   2/8/2024  9:00 AM MD Lilian [id number] [facility]   2/15/2024  9:00 AM MD Lilian [id number] [facility]   2/15/2024  2:00 PM Poonam De Jesus MD [id number] [facility]   2/22/2024  9:00 AM MD Lilian [id number] [facility]   2/29/2024  9:15 AM MD Lilian [id number] [facility]   3/7/2024  9:15 AM CHEL Lindsay-JOON [id number] [facility]   3/14/2024  9:00 AM MD Lilian Baltazar300OBG [facility]           Seen & dw. Dr. Ousmane Nazario MD  PGY-1, Obstetrics & Gynecology   [facility]

## 2024-01-05 NOTE — SIGNIFICANT EVENT
R1 paged regarding patient having cramping and brown discharge with wiping. No bright red blood. No blood on pads. Patient having intermittent cramping. Per RN, unable to reach anyone on L&D, though only attempted to reach team by paging despite full L&D team with pager, phone, and Vocera. Fetal monitoring initiated by RN at 0000. NST reviewed AGA. TOCO quiet.    As patient has had brown discharge and intermittent contractions since admission, will continue to monitor bleeding. Admission Hgb 12.5. Coags wnl. Fibrinogen appropriate at 539.    Bleeding stable. Will continue to monitor.    Discussed with Dr. Steele PGY4  Ml Young MD, PGY-1    Reviewed R1 note and made edits within text.

## 2024-01-05 NOTE — CARE PLAN
The patient's goals for the shift include      The clinical goals for the shift include Patient will not have active bleeding during shift. Patient had reactive NST in AM. Patient had minimal brown spotting.

## 2024-01-05 NOTE — CONSULTS
MFM Consult    Reason for Consult: Vaginal bleeding in the s/o known placenta previa     HPI:     Ellen Crawley is a 33 y.o.  F at 30w3d dated by LMP cw 7wk US presenting for vaginal bleeding in the setting of a known placenta previa. Patient reports one episode of painless light brown spotting yesterday morning with wiping around 8:30am. She subsequently had an additional episode of brown spotting with wiping overnight last night. She denies bright red bleeding or passage of clots She reports intermittent cramping. Patient initially concerned for leakage of fluid upon presentation to OB triage. Reports leakage of clear, thin fluid. Denies large gush; does not believe it is urine. She otherwise denies contractions and reports good FM.     Pregnancy notable for:  - Posterior placenta previa, marginal with edge at the cervical os  - Hx of PTD x2 @ 36wks (spontaneous and one for oligo)  - Anxiety, no meds  - Hx of PPH  - Rubella non-immune, offer PP      Pregnancy Problems (from 24 to present)       Problem Noted Resolved    Placenta previa in second trimester 10/12/2023 by Brisa Nichols MD No    Priority:  Medium      Overview Addendum 2023  9:49 AM by Brisa Nichols MD     Monitor with Ultrasounds  Next USN          Fetal abnormality affecting management of mother 10/12/2023 by Brisa Nichols MD No    Priority:  Medium      Overview Addendum 2023  9:55 AM by Brisa Nichols MD     Did testing with outside lab which showed male gender. Ultrasound shows female gender. Will send for PreQuel testing.   Did see genetics for counseling on 10/11/23  PreQuel shows normal XX          Prenatal care, subsequent pregnancy in second trimester 10/11/2023 by Brisa Nichols MD No    Priority:  Medium      Overview Addendum 2023 10:25 AM by Brisa Nichols MD     Declined genetics  Hx PP hemorrhage.          Urinary tract infection in mother during first  trimester of pregnancy 10/11/2023 by Brisa Nicohls MD No    Priority:  Medium      Overview Signed 10/11/2023  7:25 AM by Brisa Nichols MD     Treated with Macrobid         Rubella non-immune status, antepartum 10/11/2023 by Brisa Nichols MD No    Priority:  Medium      Overview Signed 10/11/2023  7:26 AM by Brisa Nichols MD     Rubella equivocal. Needs MMR postpartum.                     Obstetrical History   OB History          4    Para   2    Term   2            AB   1    Living   2         SAB   1    IAB        Ectopic        Multiple        Live Births   1                 Past Medical History  Past Medical History:   Diagnosis Date    Personal history of other diseases of the respiratory system 2021    History of acute bacterial sinusitis    Unspecified amblyopia, left eye     Amblyopia of left eye    Urinary tract infection         Past Surgical History   Past Surgical History:   Procedure Laterality Date    OTHER SURGICAL HISTORY  02/10/2021    Abscess incision and drainage       Social History  Social History     Socioeconomic History    Marital status:      Spouse name: Not on file    Number of children: Not on file    Years of education: Not on file    Highest education level: Not on file   Occupational History    Not on file   Tobacco Use    Smoking status: Never     Passive exposure: Never    Smokeless tobacco: Never   Vaping Use    Vaping Use: Never used   Substance and Sexual Activity    Alcohol use: Not Currently    Drug use: Never    Sexual activity: Yes     Partners: Male   Other Topics Concern    Not on file   Social History Narrative    Not on file     Social Determinants of Health     Financial Resource Strain: Not on file   Food Insecurity: Not on file   Transportation Needs: Not on file   Physical Activity: Not on file   Stress: Not on file   Social Connections: Not on file   Intimate Partner Violence: Not on file  "      Allergies  Allergies   Allergen Reactions    Clindamycin Hives and Rash       Medications  Medications Prior to Admission   Medication Sig Dispense Refill Last Dose    compress.stocking,knee,reg,med (Jobst Anti-Embolism Stocking) misc 1 PAIR OF JOBST Anti-embolism stockings  medium 1 each 0        OBJECTIVE:   /70   Pulse 73   Temp 36 °C (96.8 °F) (Temporal)   Resp 18   Ht 1.651 m (5' 5\")   Wt 65.7 kg (144 lb 13.5 oz)   LMP 2023   SpO2 97%   BMI 24.10 kg/m²    Temp  Min: 36 °C (96.8 °F)  Max: 36.6 °C (97.9 °F)  Pulse  Min: 73  Max: 85  BP  Min: 102/68  Max: 127/78    Physical exam:  General:  AAOx3, No acute distress  Cardiovascular: Warm and well perfused  Respiratory: Normal respiratory effort   Abdominal:  Soft, gravid, non-tender, no rebound or guarding, no palpable contractions   Back: No CVA tenderness  Extremities: Warm, well perfused, no edema, no calf tenderness   Pelvic:              SSE () - No pooling or active bleeding           SVE (): deferred given previa     NST: Baseline 130, + accelerations , - decelerations, mod variability   [place]: quiet     Labs:   Lab Results   Component Value Date    ABO A 2024    LABRH POS 2024    ABSCRN NEG 2024     Lab Results   Component Value Date    WBC 10.3 2024    HGB 12.5 2024    HCT 36.8 2024     2024     Lab Results   Component Value Date    APTT 31 2024    PROTIME 10.8 2024    INR 1.0 2024     Lab Results   Component Value Date    FIBRINOGEN 539 (H) 2024       ASSESSMENT AND PLAN:     Ellen Crawley is a 33 y.o.  F at 30w3d dated by LMP cw 7wk US presenting for vaginal bleeding in the setting of a known placenta previa.    Posterior Placenta Previa  - Initially dx'd on 10/11 US; previously covered the os by 17mm. As of : marginal placenta previa with the edge at, but not extending beyond, the internal cervical os   - Previously presented to [facility] ED " 10/14 for bright red bleeding. States this episode is different as it is dark brown   - Coags and Fibrinogen (1/4) wnl. Rh + rhogam not indicated   - From initial evaluation in OB triage: No active bleeding from os, no red blood or clots noted. Cervix visually closed  - Overall stable overnight. One additional episode of painless dark brown spotting with wiping + intermittent ctx. Was subsequently put on monitor with reassuring tracing.   - Will plan for repeat speculum exam today to assess for active bleed    R/o PPROM  - Negative for pooling, nitrazine, and ferning while in OB triage  - Denies large gush of fluid.   - Will reassess today with SSE    Maternal Well-Being  - VSS, HR 73, Normotensive   - Hgb (1/4) 12.5  - Coags, fibrinogen wnl, as above.     IUP @ 30.4wga  - H/o PTB x2 @ 36wks- one spontaneous and one IOL for oligohydramnios   - PNL reviewed and UTD  - Reassuring AM NST    - Magnesium, BMZ deferred given clinical stability   - Continue daily NSTs while inpatient   - Ultrasound (12/29): EFW 1371g (29%), AC (51%), cephalic     Routine:  - GBS Collected 1/4-pending  - TDAP: Received 12/28  - Flu: will offer prior to discharge  - 1hr: 11/30-wnl   - BCM: Continue to address     Dispo: Continue inpatient observation. Pt to be seen & dw. Dr. Phillip Nazario MD  PGY-1, Obstetrics & Gynecology   [facility]  Pager [phone]     Principal Problem:    Vaginal spotting

## 2024-01-05 NOTE — CARE PLAN
The patient's goals for the shift include      The clinical goals for the shift include remain free from active bleeding

## 2024-01-07 LAB — GP B STREP GENITAL QL CULT: NORMAL

## 2024-01-08 ENCOUNTER — TELEPHONE (OUTPATIENT)
Dept: OBSTETRICS AND GYNECOLOGY | Facility: CLINIC | Age: 34
End: 2024-01-08
Payer: COMMERCIAL

## 2024-01-08 NOTE — TELEPHONE ENCOUNTER
Dr. Nichols responded to pt via [employer] message and explained why a sooner ultrasound is not recommeneded

## 2024-01-08 NOTE — SIGNIFICANT EVENT
Follow-up Phone Call Note:   Interview:  Care Type: Women's Health   Phone Number Call  [phone]   Call Outcome: Left Message          Date/Time Of Call: 1/8/24 at 1332   Call Back Done By: care coordinator   Callback Complete:  Yes

## 2024-01-11 ENCOUNTER — ROUTINE PRENATAL (OUTPATIENT)
Dept: OBSTETRICS AND GYNECOLOGY | Facility: CLINIC | Age: 34
End: 2024-01-11
Payer: COMMERCIAL

## 2024-01-11 VITALS — SYSTOLIC BLOOD PRESSURE: 102 MMHG | DIASTOLIC BLOOD PRESSURE: 60 MMHG | BODY MASS INDEX: 24.13 KG/M2 | WEIGHT: 145 LBS

## 2024-01-11 DIAGNOSIS — O09.899 RUBELLA NON-IMMUNE STATUS, ANTEPARTUM (HHS-HCC): ICD-10-CM

## 2024-01-11 DIAGNOSIS — Z3A.31 31 WEEKS GESTATION OF PREGNANCY (HHS-HCC): ICD-10-CM

## 2024-01-11 DIAGNOSIS — Z28.39 RUBELLA NON-IMMUNE STATUS, ANTEPARTUM (HHS-HCC): ICD-10-CM

## 2024-01-11 DIAGNOSIS — Z34.82 PRENATAL CARE, SUBSEQUENT PREGNANCY IN SECOND TRIMESTER (HHS-HCC): ICD-10-CM

## 2024-01-11 DIAGNOSIS — O44.02 PLACENTA PREVIA IN SECOND TRIMESTER (HHS-HCC): Primary | ICD-10-CM

## 2024-01-11 PROCEDURE — 99213 OFFICE O/P EST LOW 20 MIN: CPT | Performed by: OBSTETRICS & GYNECOLOGY

## 2024-01-11 NOTE — PROGRESS NOTES
31.3 week Ob visit.   Hospitalized last week after episode of bleeding. USN 12/29 still shows a marginal placenta previa.   Next USN scheduled for 1/29/24    Problem List Items Addressed This Visit       Prenatal care, subsequent pregnancy in second trimester    Overview     Declined genetics  Hx PP hemorrhage.          Rubella non-immune status, antepartum    Overview     Rubella equivocal. Needs MMR postpartum.         Placenta previa in second trimester - Primary    Overview     Monitor with Ultrasounds  USN 12/29 Marginal previa.   Hospitalized with bleeding 1/4-1/5/24          Other Visit Diagnoses       31 weeks gestation of pregnancy

## 2024-01-16 ENCOUNTER — HOSPITAL ENCOUNTER (OUTPATIENT)
Facility: HOSPITAL | Age: 34
Discharge: HOSPICE | End: 2024-01-16
Attending: OBSTETRICS & GYNECOLOGY | Admitting: OBSTETRICS & GYNECOLOGY
Payer: COMMERCIAL

## 2024-01-16 ENCOUNTER — HOSPITAL ENCOUNTER (INPATIENT)
Facility: HOSPITAL | Age: 34
LOS: 1 days | Discharge: HOME | End: 2024-01-17
Attending: OBSTETRICS & GYNECOLOGY | Admitting: OBSTETRICS & GYNECOLOGY
Payer: COMMERCIAL

## 2024-01-16 ENCOUNTER — TELEPHONE (OUTPATIENT)
Dept: OBSTETRICS AND GYNECOLOGY | Facility: CLINIC | Age: 34
End: 2024-01-16
Payer: COMMERCIAL

## 2024-01-16 VITALS
RESPIRATION RATE: 14 BRPM | WEIGHT: 146.06 LBS | HEART RATE: 82 BPM | BODY MASS INDEX: 24.33 KG/M2 | DIASTOLIC BLOOD PRESSURE: 50 MMHG | OXYGEN SATURATION: 97 % | SYSTOLIC BLOOD PRESSURE: 95 MMHG | HEIGHT: 65 IN | TEMPERATURE: 96.6 F

## 2024-01-16 DIAGNOSIS — O44.00: Primary | ICD-10-CM

## 2024-01-16 LAB
ABO GROUP (TYPE) IN BLOOD: NORMAL
ABO GROUP (TYPE) IN BLOOD: NORMAL
ALBUMIN SERPL BCP-MCNC: 3.4 G/DL (ref 3.4–5)
ALP SERPL-CCNC: 105 U/L (ref 33–110)
ALT SERPL W P-5'-P-CCNC: 8 U/L (ref 7–45)
ANION GAP SERPL CALC-SCNC: 13 MMOL/L (ref 10–20)
ANTIBODY SCREEN: NORMAL
ANTIBODY SCREEN: NORMAL
APPEARANCE UR: ABNORMAL
APTT PPP: 30 SECONDS (ref 27–38)
APTT PPP: 31 SECONDS (ref 27–38)
AST SERPL W P-5'-P-CCNC: 13 U/L (ref 9–39)
BILIRUB SERPL-MCNC: 0.3 MG/DL (ref 0–1.2)
BILIRUB UR STRIP.AUTO-MCNC: NEGATIVE MG/DL
BUN SERPL-MCNC: 5 MG/DL (ref 6–23)
CALCIUM SERPL-MCNC: 8.7 MG/DL (ref 8.6–10.6)
CHLORIDE SERPL-SCNC: 107 MMOL/L (ref 98–107)
CO2 SERPL-SCNC: 20 MMOL/L (ref 21–32)
COLOR UR: YELLOW
CREAT SERPL-MCNC: 0.39 MG/DL (ref 0.5–1.05)
EGFRCR SERPLBLD CKD-EPI 2021: >90 ML/MIN/1.73M*2
ERYTHROCYTE [DISTWIDTH] IN BLOOD BY AUTOMATED COUNT: 13.6 % (ref 11.5–14.5)
ERYTHROCYTE [DISTWIDTH] IN BLOOD BY AUTOMATED COUNT: 13.7 % (ref 11.5–14.5)
FIBRINOGEN PPP-MCNC: 457 MG/DL (ref 200–400)
FIBRINOGEN PPP-MCNC: 524 MG/DL (ref 200–400)
GLUCOSE SERPL-MCNC: 72 MG/DL (ref 74–99)
GLUCOSE UR STRIP.AUTO-MCNC: NEGATIVE MG/DL
HCT VFR BLD AUTO: 33.7 % (ref 36–46)
HCT VFR BLD AUTO: 33.8 % (ref 36–46)
HGB BLD-MCNC: 11.1 G/DL (ref 12–16)
HGB BLD-MCNC: 11.2 G/DL (ref 12–16)
INR PPP: 1 (ref 0.9–1.1)
INR PPP: 1 (ref 0.9–1.1)
KETONES UR STRIP.AUTO-MCNC: NEGATIVE MG/DL
LEUKOCYTE ESTERASE UR QL STRIP.AUTO: ABNORMAL
MCH RBC QN AUTO: 30.3 PG (ref 26–34)
MCH RBC QN AUTO: 31 PG (ref 26–34)
MCHC RBC AUTO-ENTMCNC: 32.9 G/DL (ref 32–36)
MCHC RBC AUTO-ENTMCNC: 33.1 G/DL (ref 32–36)
MCV RBC AUTO: 92 FL (ref 80–100)
MCV RBC AUTO: 94 FL (ref 80–100)
MUCOUS THREADS #/AREA URNS AUTO: NORMAL /LPF
NITRITE UR QL STRIP.AUTO: NEGATIVE
NRBC BLD-RTO: 0 /100 WBCS (ref 0–0)
NRBC BLD-RTO: 0 /100 WBCS (ref 0–0)
PH UR STRIP.AUTO: 6 [PH]
PLATELET # BLD AUTO: 148 X10*3/UL (ref 150–450)
PLATELET # BLD AUTO: 151 X10*3/UL (ref 150–450)
POTASSIUM SERPL-SCNC: 3.9 MMOL/L (ref 3.5–5.3)
PROT SERPL-MCNC: 6.1 G/DL (ref 6.4–8.2)
PROT UR STRIP.AUTO-MCNC: NEGATIVE MG/DL
PROTHROMBIN TIME: 10.9 SECONDS (ref 9.8–12.8)
PROTHROMBIN TIME: 11.1 SECONDS (ref 9.8–12.8)
RBC # BLD AUTO: 3.61 X10*6/UL (ref 4–5.2)
RBC # BLD AUTO: 3.66 X10*6/UL (ref 4–5.2)
RBC # UR STRIP.AUTO: NEGATIVE /UL
RBC #/AREA URNS AUTO: NORMAL /HPF
RH FACTOR (ANTIGEN D): NORMAL
RH FACTOR (ANTIGEN D): NORMAL
SODIUM SERPL-SCNC: 136 MMOL/L (ref 136–145)
SP GR UR STRIP.AUTO: 1
SQUAMOUS #/AREA URNS AUTO: NORMAL /HPF
TREPONEMA PALLIDUM IGG+IGM AB [PRESENCE] IN SERUM OR PLASMA BY IMMUNOASSAY: NONREACTIVE
UROBILINOGEN UR STRIP.AUTO-MCNC: <2 MG/DL
WBC # BLD AUTO: 8.1 X10*3/UL (ref 4.4–11.3)
WBC # BLD AUTO: 8.2 X10*3/UL (ref 4.4–11.3)
WBC #/AREA URNS AUTO: NORMAL /HPF

## 2024-01-16 PROCEDURE — 85610 PROTHROMBIN TIME: CPT | Performed by: STUDENT IN AN ORGANIZED HEALTH CARE EDUCATION/TRAINING PROGRAM

## 2024-01-16 PROCEDURE — 99222 1ST HOSP IP/OBS MODERATE 55: CPT | Performed by: STUDENT IN AN ORGANIZED HEALTH CARE EDUCATION/TRAINING PROGRAM

## 2024-01-16 PROCEDURE — 85384 FIBRINOGEN ACTIVITY: CPT | Performed by: OBSTETRICS & GYNECOLOGY

## 2024-01-16 PROCEDURE — 2500000004 HC RX 250 GENERAL PHARMACY W/ HCPCS (ALT 636 FOR OP/ED): Performed by: STUDENT IN AN ORGANIZED HEALTH CARE EDUCATION/TRAINING PROGRAM

## 2024-01-16 PROCEDURE — 80053 COMPREHEN METABOLIC PANEL: CPT | Performed by: STUDENT IN AN ORGANIZED HEALTH CARE EDUCATION/TRAINING PROGRAM

## 2024-01-16 PROCEDURE — 86780 TREPONEMA PALLIDUM: CPT | Performed by: STUDENT IN AN ORGANIZED HEALTH CARE EDUCATION/TRAINING PROGRAM

## 2024-01-16 PROCEDURE — 1120000001 HC OB PRIVATE ROOM DAILY

## 2024-01-16 PROCEDURE — 85384 FIBRINOGEN ACTIVITY: CPT | Performed by: STUDENT IN AN ORGANIZED HEALTH CARE EDUCATION/TRAINING PROGRAM

## 2024-01-16 PROCEDURE — 94760 N-INVAS EAR/PLS OXIMETRY 1: CPT

## 2024-01-16 PROCEDURE — 99215 OFFICE O/P EST HI 40 MIN: CPT | Mod: 25

## 2024-01-16 PROCEDURE — 85730 THROMBOPLASTIN TIME PARTIAL: CPT | Performed by: OBSTETRICS & GYNECOLOGY

## 2024-01-16 PROCEDURE — 86900 BLOOD TYPING SEROLOGIC ABO: CPT | Performed by: OBSTETRICS & GYNECOLOGY

## 2024-01-16 PROCEDURE — 36415 COLL VENOUS BLD VENIPUNCTURE: CPT | Performed by: OBSTETRICS & GYNECOLOGY

## 2024-01-16 PROCEDURE — 86901 BLOOD TYPING SEROLOGIC RH(D): CPT | Performed by: STUDENT IN AN ORGANIZED HEALTH CARE EDUCATION/TRAINING PROGRAM

## 2024-01-16 PROCEDURE — 85027 COMPLETE CBC AUTOMATED: CPT | Performed by: STUDENT IN AN ORGANIZED HEALTH CARE EDUCATION/TRAINING PROGRAM

## 2024-01-16 PROCEDURE — 36415 COLL VENOUS BLD VENIPUNCTURE: CPT

## 2024-01-16 PROCEDURE — 99213 OFFICE O/P EST LOW 20 MIN: CPT | Performed by: OBSTETRICS & GYNECOLOGY

## 2024-01-16 PROCEDURE — G0378 HOSPITAL OBSERVATION PER HR: HCPCS

## 2024-01-16 PROCEDURE — 2500000004 HC RX 250 GENERAL PHARMACY W/ HCPCS (ALT 636 FOR OP/ED): Performed by: OBSTETRICS & GYNECOLOGY

## 2024-01-16 PROCEDURE — 85027 COMPLETE CBC AUTOMATED: CPT | Performed by: OBSTETRICS & GYNECOLOGY

## 2024-01-16 PROCEDURE — 36415 COLL VENOUS BLD VENIPUNCTURE: CPT | Performed by: STUDENT IN AN ORGANIZED HEALTH CARE EDUCATION/TRAINING PROGRAM

## 2024-01-16 PROCEDURE — 81001 URINALYSIS AUTO W/SCOPE: CPT | Performed by: OBSTETRICS & GYNECOLOGY

## 2024-01-16 RX ORDER — OXYTOCIN 10 [USP'U]/ML
10 INJECTION, SOLUTION INTRAMUSCULAR; INTRAVENOUS ONCE AS NEEDED
Status: DISCONTINUED | OUTPATIENT
Start: 2024-01-16 | End: 2024-01-17

## 2024-01-16 RX ORDER — ONDANSETRON 4 MG/1
4 TABLET, FILM COATED ORAL EVERY 6 HOURS PRN
Status: DISCONTINUED | OUTPATIENT
Start: 2024-01-16 | End: 2024-01-16 | Stop reason: HOSPADM

## 2024-01-16 RX ORDER — HYDRALAZINE HYDROCHLORIDE 20 MG/ML
5 INJECTION INTRAMUSCULAR; INTRAVENOUS ONCE AS NEEDED
Status: DISCONTINUED | OUTPATIENT
Start: 2024-01-16 | End: 2024-01-16 | Stop reason: HOSPADM

## 2024-01-16 RX ORDER — LABETALOL HYDROCHLORIDE 5 MG/ML
20 INJECTION, SOLUTION INTRAVENOUS ONCE AS NEEDED
Status: DISCONTINUED | OUTPATIENT
Start: 2024-01-16 | End: 2024-01-16 | Stop reason: HOSPADM

## 2024-01-16 RX ORDER — METOCLOPRAMIDE HYDROCHLORIDE 5 MG/ML
10 INJECTION INTRAMUSCULAR; INTRAVENOUS EVERY 6 HOURS PRN
Status: DISCONTINUED | OUTPATIENT
Start: 2024-01-16 | End: 2024-01-16 | Stop reason: HOSPADM

## 2024-01-16 RX ORDER — MISOPROSTOL 200 UG/1
800 TABLET ORAL ONCE AS NEEDED
Status: DISCONTINUED | OUTPATIENT
Start: 2024-01-16 | End: 2024-01-17

## 2024-01-16 RX ORDER — LOPERAMIDE HYDROCHLORIDE 2 MG/1
4 CAPSULE ORAL EVERY 2 HOUR PRN
Status: DISCONTINUED | OUTPATIENT
Start: 2024-01-16 | End: 2024-01-17

## 2024-01-16 RX ORDER — SODIUM CHLORIDE, SODIUM LACTATE, POTASSIUM CHLORIDE, CALCIUM CHLORIDE 600; 310; 30; 20 MG/100ML; MG/100ML; MG/100ML; MG/100ML
125 INJECTION, SOLUTION INTRAVENOUS CONTINUOUS
Status: DISCONTINUED | OUTPATIENT
Start: 2024-01-16 | End: 2024-01-17

## 2024-01-16 RX ORDER — ONDANSETRON HYDROCHLORIDE 2 MG/ML
4 INJECTION, SOLUTION INTRAVENOUS EVERY 6 HOURS PRN
Status: DISCONTINUED | OUTPATIENT
Start: 2024-01-16 | End: 2024-01-17

## 2024-01-16 RX ORDER — NIFEDIPINE 10 MG/1
10 CAPSULE ORAL ONCE AS NEEDED
Status: DISCONTINUED | OUTPATIENT
Start: 2024-01-16 | End: 2024-01-16 | Stop reason: HOSPADM

## 2024-01-16 RX ORDER — METOCLOPRAMIDE HYDROCHLORIDE 5 MG/ML
10 INJECTION INTRAMUSCULAR; INTRAVENOUS EVERY 6 HOURS PRN
Status: DISCONTINUED | OUTPATIENT
Start: 2024-01-16 | End: 2024-01-17

## 2024-01-16 RX ORDER — OXYTOCIN/0.9 % SODIUM CHLORIDE 30/500 ML
60 PLASTIC BAG, INJECTION (ML) INTRAVENOUS ONCE AS NEEDED
Status: DISCONTINUED | OUTPATIENT
Start: 2024-01-16 | End: 2024-01-17

## 2024-01-16 RX ORDER — METOCLOPRAMIDE 10 MG/1
10 TABLET ORAL EVERY 6 HOURS PRN
Status: DISCONTINUED | OUTPATIENT
Start: 2024-01-16 | End: 2024-01-16 | Stop reason: HOSPADM

## 2024-01-16 RX ORDER — ONDANSETRON HYDROCHLORIDE 2 MG/ML
4 INJECTION, SOLUTION INTRAVENOUS EVERY 6 HOURS PRN
Status: DISCONTINUED | OUTPATIENT
Start: 2024-01-16 | End: 2024-01-16 | Stop reason: HOSPADM

## 2024-01-16 RX ORDER — BETAMETHASONE SODIUM PHOSPHATE AND BETAMETHASONE ACETATE 3; 3 MG/ML; MG/ML
12 INJECTION, SUSPENSION INTRA-ARTICULAR; INTRALESIONAL; INTRAMUSCULAR; SOFT TISSUE EVERY 24 HOURS
Status: COMPLETED | OUTPATIENT
Start: 2024-01-16 | End: 2024-01-17

## 2024-01-16 RX ORDER — NIFEDIPINE 10 MG/1
10 CAPSULE ORAL ONCE AS NEEDED
Status: DISCONTINUED | OUTPATIENT
Start: 2024-01-16 | End: 2024-01-17

## 2024-01-16 RX ORDER — ONDANSETRON 4 MG/1
4 TABLET, FILM COATED ORAL EVERY 6 HOURS PRN
Status: DISCONTINUED | OUTPATIENT
Start: 2024-01-16 | End: 2024-01-17

## 2024-01-16 RX ORDER — CARBOPROST TROMETHAMINE 250 UG/ML
250 INJECTION, SOLUTION INTRAMUSCULAR ONCE AS NEEDED
Status: DISCONTINUED | OUTPATIENT
Start: 2024-01-16 | End: 2024-01-17

## 2024-01-16 RX ORDER — LABETALOL HYDROCHLORIDE 5 MG/ML
20 INJECTION, SOLUTION INTRAVENOUS ONCE AS NEEDED
Status: DISCONTINUED | OUTPATIENT
Start: 2024-01-16 | End: 2024-01-17

## 2024-01-16 RX ORDER — LIDOCAINE HYDROCHLORIDE 10 MG/ML
30 INJECTION INFILTRATION; PERINEURAL ONCE AS NEEDED
Status: DISCONTINUED | OUTPATIENT
Start: 2024-01-16 | End: 2024-01-17

## 2024-01-16 RX ORDER — METHYLERGONOVINE MALEATE 0.2 MG/ML
0.2 INJECTION INTRAVENOUS ONCE AS NEEDED
Status: DISCONTINUED | OUTPATIENT
Start: 2024-01-16 | End: 2024-01-17

## 2024-01-16 RX ORDER — ACETAMINOPHEN 325 MG/1
650 TABLET ORAL EVERY 6 HOURS PRN
Status: DISCONTINUED | OUTPATIENT
Start: 2024-01-16 | End: 2024-01-16

## 2024-01-16 RX ORDER — TRANEXAMIC ACID 100 MG/ML
1000 INJECTION, SOLUTION INTRAVENOUS ONCE AS NEEDED
Status: DISCONTINUED | OUTPATIENT
Start: 2024-01-16 | End: 2024-01-17

## 2024-01-16 RX ORDER — METOCLOPRAMIDE 10 MG/1
10 TABLET ORAL EVERY 6 HOURS PRN
Status: DISCONTINUED | OUTPATIENT
Start: 2024-01-16 | End: 2024-01-17

## 2024-01-16 RX ORDER — TERBUTALINE SULFATE 1 MG/ML
0.25 INJECTION SUBCUTANEOUS ONCE AS NEEDED
Status: DISCONTINUED | OUTPATIENT
Start: 2024-01-16 | End: 2024-01-17

## 2024-01-16 RX ORDER — ACETAMINOPHEN 325 MG/1
975 TABLET ORAL ONCE
Status: DISCONTINUED | OUTPATIENT
Start: 2024-01-16 | End: 2024-01-17

## 2024-01-16 RX ORDER — LIDOCAINE HYDROCHLORIDE 10 MG/ML
0.5 INJECTION INFILTRATION; PERINEURAL ONCE AS NEEDED
Status: DISCONTINUED | OUTPATIENT
Start: 2024-01-16 | End: 2024-01-16 | Stop reason: HOSPADM

## 2024-01-16 RX ORDER — HYDRALAZINE HYDROCHLORIDE 20 MG/ML
5 INJECTION INTRAMUSCULAR; INTRAVENOUS ONCE AS NEEDED
Status: DISCONTINUED | OUTPATIENT
Start: 2024-01-16 | End: 2024-01-17

## 2024-01-16 RX ADMIN — SODIUM CHLORIDE, SODIUM LACTATE, POTASSIUM CHLORIDE, AND CALCIUM CHLORIDE 500 ML: 600; 310; 30; 20 INJECTION, SOLUTION INTRAVENOUS at 13:45

## 2024-01-16 RX ADMIN — BETAMETHASONE ACETATE AND BETAMETHASONE SODIUM PHOSPHATE 12 MG: 3; 3 INJECTION, SUSPENSION INTRA-ARTICULAR; INTRALESIONAL; INTRAMUSCULAR; SOFT TISSUE at 18:44

## 2024-01-16 RX ADMIN — SODIUM CHLORIDE, POTASSIUM CHLORIDE, SODIUM LACTATE AND CALCIUM CHLORIDE 125 ML/HR: 600; 310; 30; 20 INJECTION, SOLUTION INTRAVENOUS at 18:56

## 2024-01-16 SDOH — HEALTH STABILITY: MENTAL HEALTH: NON-SPECIFIC ACTIVE SUICIDAL THOUGHTS (PAST 1 MONTH): NO

## 2024-01-16 SDOH — SOCIAL STABILITY: SOCIAL INSECURITY: ARE YOU OR HAVE YOU BEEN THREATENED OR ABUSED PHYSICALLY, EMOTIONALLY, OR SEXUALLY BY ANYONE?: NO

## 2024-01-16 SDOH — HEALTH STABILITY: MENTAL HEALTH: WISH TO BE DEAD (PAST 1 MONTH): NO

## 2024-01-16 SDOH — SOCIAL STABILITY: SOCIAL INSECURITY: DOES ANYONE TRY TO KEEP YOU FROM HAVING/CONTACTING OTHER FRIENDS OR DOING THINGS OUTSIDE YOUR HOME?: NO

## 2024-01-16 SDOH — HEALTH STABILITY: MENTAL HEALTH: SUICIDAL BEHAVIOR (LIFETIME): NO

## 2024-01-16 SDOH — SOCIAL STABILITY: SOCIAL INSECURITY: DO YOU FEEL ANYONE HAS EXPLOITED OR TAKEN ADVANTAGE OF YOU FINANCIALLY OR OF YOUR PERSONAL PROPERTY?: NO

## 2024-01-16 SDOH — SOCIAL STABILITY: SOCIAL INSECURITY: PHYSICAL ABUSE: DENIES

## 2024-01-16 SDOH — SOCIAL STABILITY: SOCIAL INSECURITY: ABUSE SCREEN: ADULT

## 2024-01-16 SDOH — HEALTH STABILITY: MENTAL HEALTH: WERE YOU ABLE TO COMPLETE ALL THE BEHAVIORAL HEALTH SCREENINGS?: YES

## 2024-01-16 SDOH — SOCIAL STABILITY: SOCIAL INSECURITY: HAS ANYONE EVER THREATENED TO HURT YOUR FAMILY OR YOUR PETS?: NO

## 2024-01-16 SDOH — ECONOMIC STABILITY: HOUSING INSECURITY: DO YOU FEEL UNSAFE GOING BACK TO THE PLACE WHERE YOU ARE LIVING?: NO

## 2024-01-16 SDOH — SOCIAL STABILITY: SOCIAL INSECURITY: HAVE YOU HAD THOUGHTS OF HARMING ANYONE ELSE?: NO

## 2024-01-16 SDOH — SOCIAL STABILITY: SOCIAL INSECURITY: VERBAL ABUSE: DENIES

## 2024-01-16 SDOH — SOCIAL STABILITY: SOCIAL INSECURITY: ARE THERE ANY APPARENT SIGNS OF INJURIES/BEHAVIORS THAT COULD BE RELATED TO ABUSE/NEGLECT?: NO

## 2024-01-16 ASSESSMENT — PATIENT HEALTH QUESTIONNAIRE - PHQ9
1. LITTLE INTEREST OR PLEASURE IN DOING THINGS: NOT AT ALL
2. FEELING DOWN, DEPRESSED OR HOPELESS: NOT AT ALL
SUM OF ALL RESPONSES TO PHQ9 QUESTIONS 1 & 2: 0
SUM OF ALL RESPONSES TO PHQ9 QUESTIONS 1 & 2: 0
2. FEELING DOWN, DEPRESSED OR HOPELESS: NOT AT ALL
1. LITTLE INTEREST OR PLEASURE IN DOING THINGS: NOT AT ALL

## 2024-01-16 ASSESSMENT — PAIN SCALES - GENERAL
PAINLEVEL_OUTOF10: 0 - NO PAIN
PAINLEVEL_OUTOF10: 6
PAINLEVEL_OUTOF10: 0 - NO PAIN

## 2024-01-16 ASSESSMENT — LIFESTYLE VARIABLES
SKIP TO QUESTIONS 9-10: 1
AUDIT-C TOTAL SCORE: 0
HOW OFTEN DO YOU HAVE 6 OR MORE DRINKS ON ONE OCCASION: NEVER
SKIP TO QUESTIONS 9-10: 1
HOW MANY STANDARD DRINKS CONTAINING ALCOHOL DO YOU HAVE ON A TYPICAL DAY: PATIENT DOES NOT DRINK
HOW MANY STANDARD DRINKS CONTAINING ALCOHOL DO YOU HAVE ON A TYPICAL DAY: PATIENT DOES NOT DRINK
HOW OFTEN DO YOU HAVE A DRINK CONTAINING ALCOHOL: NEVER
AUDIT-C TOTAL SCORE: 0
AUDIT-C TOTAL SCORE: 0
HOW OFTEN DO YOU HAVE A DRINK CONTAINING ALCOHOL: NEVER
AUDIT-C TOTAL SCORE: 0
HOW OFTEN DO YOU HAVE 6 OR MORE DRINKS ON ONE OCCASION: NEVER

## 2024-01-16 ASSESSMENT — ACTIVITIES OF DAILY LIVING (ADL): LACK_OF_TRANSPORTATION: NO

## 2024-01-16 NOTE — TELEPHONE ENCOUNTER
contacted pt  name and  verified  Spoke with pt Gave information to [facility]:    Black running old blood like snotty mucous  No contractions or cramping   Baby is moving like normal  Also had discharge last night    Response:     I would observe for now. Probably an old clot liquefying. If continues to have watery discharge should go to L&D or if turns bright red        Pt requesting call from [facility] if available, states she is confused if she should come into hospital or not. She had symptoms since last night still having discharge today with wiping. Explained that [facility] may not be able to call right away. Based on response if the symptoms are persistent go get evaluated at L&D.

## 2024-01-16 NOTE — H&P
Obstetrical Admission History and Physical     Ellen Crawley is a 33 y.o.  at 32w1d. OSCAR: 3/11/2024, by Last Menstrual Period. Estimated fetal weight: 1371g. She has had prenatal care with Dr. Nichols .    Chief Complaint: Abdominal Pain and Vaginal Bleeding (Known placenta previa at 15 weeks. Denies leakage of fluid. +EFM)    Assessment/Plan    Vaginal Spotting w/ known placenta previa  - Dark brown discharge vs clot early this morning with cramping  - No active bleeding from os, no red blood or clots present in vault  - Cervix: visually closed  - Cat I tracing  - [place]: quiet, pt reporting intermittent cramping  - RH: A+  - Wet prep, urine culture negative   -  Fibrinogen 539, hgb 12.5, coags wnl  - Recently admitted - for similar episode  - Discussed with Dr. Bernardo, will plan for transfer for further monitoring at [facility] given second episode of bleeding and cramping.  Discussed with patient and  who agree with plan.    IUP at 32w1d   - NST reassuring  - GBS negative       Ender Cole MD         Pregnancy Problems (from 24 to present)       Problem Noted Resolved    Placenta previa in second trimester 10/12/2023 by Brisa Nichols MD No    Priority:  Medium      Overview Addendum 2023  9:49 AM by Brisa Nichols MD     Monitor with Ultrasounds  Next USN          Fetal abnormality affecting management of mother 10/12/2023 by Brisa Nichols MD No    Priority:  Medium      Overview Addendum 2023  9:55 AM by Brisa Nichols MD     Did testing with outside lab which showed male gender. Ultrasound shows female gender. Will send for PreQuel testing.   Did see genetics for counseling on 10/11/23  PreQuel shows normal XX          Prenatal care, subsequent pregnancy in second trimester 10/11/2023 by Brisa Nichols MD No    Priority:  Medium      Overview Addendum 2023 10:25 AM by Brisa Nichols MD     Declined genetics  Hx PP  hemorrhage.          Urinary tract infection in mother during first trimester of pregnancy 10/11/2023 by Brisa Nichols MD No    Priority:  Medium      Overview Signed 10/11/2023  7:25 AM by Brisa Nichols MD     Treated with Macrobid         Rubella non-immune status, antepartum 10/11/2023 by Brisa Nichols MD No    Priority:  Medium      Overview Signed 10/11/2023  7:26 AM by Brisa Nichols MD     Rubella equivocal. Needs MMR postpartum.               Shania Hughes is a 32yo  at 32w1d who presents with vaginal bleeding in the setting of known placenta previa.  Denies bright red bleeding.  She called the office and was told to present to triage.  She was recently admitted at [facility] for a similar episode on  -  for monitoring.  She starting having cramping starting at 0400 followed by several episodes of diarrhea, last episode about one hour ago.  No LOF, she is feeling the baby move.      Pregnancy notable for:  - Posterior placenta previa, marginal with edge at the cervical os on US   - Hx of PTD x2 @ 36wks (spontaneous and one for oligo)  - Anxiety, no meds  - UTI, VINOD neg  - Hx of PPH  - Rubella non-immune       Obstetrical History   OB History    Para Term  AB Living   4 2 2   1 2   SAB IAB Ectopic Multiple Live Births   1       1      # Outcome Date GA Lbr Tony/2nd Weight Sex Delivery Anes PTL Lv   4 Current            3 Term 17 36w0d  3.402 kg M Vag-Spont None N    2 Term 14 36w0d  2.778 kg M Vag-Spont None N IVAN   1 SAB                Past Medical History  Past Medical History:   Diagnosis Date    Personal history of other diseases of the respiratory system 2021    History of acute bacterial sinusitis    Unspecified amblyopia, left eye     Amblyopia of left eye    Urinary tract infection         Past Surgical History   Past Surgical History:   Procedure Laterality Date    OTHER SURGICAL HISTORY  02/10/2021    Abscess incision and  drainage       Social History  Social History     Tobacco Use    Smoking status: Never     Passive exposure: Never    Smokeless tobacco: Never   Substance Use Topics    Alcohol use: Not Currently     Substance and Sexual Activity   Drug Use Never       Allergies  Clindamycin     Medications  Medications Prior to Admission   Medication Sig Dispense Refill Last Dose    compress.stocking,knee,reg,med (Jobst Anti-Embolism Stocking) misc 1 PAIR OF JOBST Anti-embolism stockings  medium 1 each 0        Objective    Last Vitals  Temp Pulse Resp BP MAP O2 Sat   (!) 35.9 °C (96.6 °F) 82 14 95/50   97 %     Physical Examination   Gen:  Alert, oriented, well-nourished, NAD  Pulm:  Normal respiratory effort  Abd:  Gravid, soft, nontender  Obstetrics  FHTs:  125, moderate variability, +accels, no decels  TOCO:  irregular  Cervix: visually closed, no active bleeding  SSE:  dark brown discharge in vault, no bright red bleeding, no clots  Ext:  Trace edema, no calf tenderness  Neuro:  Grossly intact    Lab Review  Labs in chart were reviewed.

## 2024-01-16 NOTE — TELEPHONE ENCOUNTER
I would observe for now. Probably an old clot liquefying. If continues to have watery discharge should go to L&D or if turns bright red.

## 2024-01-16 NOTE — H&P
Obstetrical Admission History and Physical    Chief Complaint: Vaginal Bleeding    Assessment/Plan    Ellen Crawley is a 33 y.o.  at 32w1d by LMP cw 7w us presents for cramping and spotting in s/o marginal previa     Marginal placenta previa, cramping/spotting  - This is second bleed of pregnancy and is larger volume than prior. SSE with scant brown vaginal discharge, no evident active bleeding. [place] irritable, pt reports cramping - c/f abruption. Abruption labs wnl x2 and Hgb 11 from 12 on last admission  - Cervix visually closed, no ctx, low c/f PTL  - Given discomfort and bleeding, will admit to L&D for monitoring, pt agreeable. No active bleeding or ctx at this time, no indication for delivery currently  - On BSUS, posterior placenta, unable to visualize placental edge. For formal US in AM  - In setting of second bleed of pregnancy, recommend BMZ. First dose     IUP  - CEFM on L&D, cat 1 currently  - Last growth : EFW 1371g (29%), AC 51%. BSUS : EFW 1750g (19%), AC 16%. MARILUZ wnl, BPP   - BMZ as above  - UTD on PNC    Maternal well-being  - Anxiety, no meds  - HA, normotensive, treat PRN    Dw Dr Marck Dominguez MD  OB/GYN PGY3     Sign out received from Dr. Dominguez. Seen with Dr. Steele and Dr. Acharya and d/w Dr. Bernardo. Edits made in note    Geraldine Dumont MD  PGY-3, Obstetrics and Gynecology    Active Problems:  There are no active Hospital Problems.      Pregnancy Problems (from 24 to present)       Problem Noted Resolved    Placenta previa in second trimester 10/12/2023 by Brisa Nichols MD No    Priority:  Medium      Overview Addendum 2024 10:16 AM by Brisa Nichols MD     Monitor with Ultrasounds  USN  Marginal previa.   Hospitalized with bleeding -24         Fetal abnormality affecting management of mother 10/12/2023 by Brisa Nichols MD No    Priority:  Medium      Overview Addendum 2023  9:55 AM by Brisa Nichols,  MD     Did testing with outside lab which showed male gender. Ultrasound shows female gender. Will send for PreQuel testing.   Did see genetics for counseling on 10/11/23  PreQuel shows normal XX          Prenatal care, subsequent pregnancy in second trimester 10/11/2023 by Brisa Nichols MD No    Priority:  Medium      Overview Addendum 2023 10:25 AM by Brisa Nichols MD     Declined genetics  Hx PP hemorrhage.          Urinary tract infection in mother during first trimester of pregnancy 10/11/2023 by Brisa Nichols MD No    Priority:  Medium      Overview Signed 10/11/2023  7:25 AM by Brisa Nichols MD     Treated with Macrobid         Rubella non-immune status, antepartum 10/11/2023 by Brisa Nichols MD No    Priority:  Medium      Overview Signed 10/11/2023  7:26 AM by Brisa Nichols MD     Rubella equivocal. Needs MMR postpartum.               Shania Crawley is a 33 y.o.  at 32w1d by LMP cw 7w us presents for cramping and spotting in s/o marginal previa     Patient was very active yesterday and overnight started having some cramping, thought it might be related to gas pain initially but around 4am noted some brown vaginal discharge and then later a dark brown/black clump of discharge she was concerned could represent a blood clot. Her cramping now feels like period cramps. Feels very anxious, Good fetal movement, no loss of fluid.     Patient has had 2 prior presentations to triage for similar complaints, once with pink while voiding, the second with brown discharge. On both occasions she was evaluated in triage and discharged home, has never received BMZ this pregnancy.    Today on presentation to [facility] she was stable with fibrinogen 457, coags wnl, and hgb 11.2. She is Rh positive. She was then [place] [facility] [place]/ monitoring     Pregnancy notable for:   - Marginal placenta previa (edge at but not extending beyond  internal os ), last evaluated at 29.4wks   - History of PPH with prior delivery ()  - Anxiety, no meds  - Hx of PTD x2 @ 36wks (spontaneous and one for oligo), both     Obstetrical History   OB History    Para Term  AB Living   4 2 2   1 2   SAB IAB Ectopic Multiple Live Births   1       1      # Outcome Date GA Lbr Tony/2nd Weight Sex Delivery Anes PTL Lv   4 Current            3 Term 17 36w0d  3.402 kg M Vag-Spont None N    2 Term 14 36w0d  2.778 kg M Vag-Spont None N IVAN   1 SAB                Past Medical History  Past Medical History:   Diagnosis Date    Personal history of other diseases of the respiratory system 2021    History of acute bacterial sinusitis    Unspecified amblyopia, left eye     Amblyopia of left eye    Urinary tract infection         Past Surgical History   Past Surgical History:   Procedure Laterality Date    DILATION AND CURETTAGE OF UTERUS N/A 10/12/2022    OTHER SURGICAL HISTORY  02/10/2021    Abscess incision and drainage       Social History  Social History     Tobacco Use    Smoking status: Never     Passive exposure: Never    Smokeless tobacco: Never   Substance Use Topics    Alcohol use: Not Currently     Substance and Sexual Activity   Drug Use Never       Allergies  Clindamycin     Medications  Medications Prior to Admission   Medication Sig Dispense Refill Last Dose    prenatal vitamin, iron-folic, (prenatal vit no.130-iron-folic) 27 mg iron-800 mcg folic acid tablet Take 1 tablet by mouth once daily.   2024 at 0900    compress.stocking,knee,reg,med (Jobst Anti-Embolism Stocking) misc 1 PAIR OF JOBST Anti-embolism stockings  medium 1 each 0 Past Month at 0900       Objective    Last Vitals  Temp Pulse Resp BP MAP O2 Sat   36.9 °C (98.4 °F) 82 16 120/74   100 %     Physical Examination  Physical Exam  Constitutional:       Appearance: Normal appearance.   HENT:      Head: Normocephalic and atraumatic.      Nose: Nose normal.       Mouth/Throat:      Mouth: Mucous membranes are moist.   Eyes:      Extraocular Movements: Extraocular movements intact.   Pulmonary:      Effort: Pulmonary effort is normal.   Abdominal:      Tenderness: There is no abdominal tenderness.      Comments: Gravid   Genitourinary:     Comments: SSE: physiologic discharge, slightly brown tinged, no bright red blood in vault. Cervix closed visually, membranes intact    Musculoskeletal:         General: Normal range of motion.   Skin:     General: Skin is warm and dry.   Neurological:      General: No focal deficit present.      Mental Status: She is alert and oriented to person, place, and time.   Psychiatric:         Mood and Affect: Mood normal.         Behavior: Behavior normal.       BSUS  Posterior placenta, unable to visualize placental edge  BPD: 7.92cm (31%)  HC: 29.60cm (27%)  AC: 26.77cm (16%)  FL: 6.08cm (24%)  EFW: 1750g (19%)  MARILUZ: 9.31cm  BPP: 8/8    Lab Review  Recent Results (from the past 24 hour(s))   CBC    Collection Time: 01/16/24  1:46 PM   Result Value Ref Range    WBC 8.2 4.4 - 11.3 x10*3/uL    nRBC 0.0 0.0 - 0.0 /100 WBCs    RBC 3.61 (L) 4.00 - 5.20 x10*6/uL    Hemoglobin 11.2 (L) 12.0 - 16.0 g/dL    Hematocrit 33.8 (L) 36.0 - 46.0 %    MCV 94 80 - 100 fL    MCH 31.0 26.0 - 34.0 pg    MCHC 33.1 32.0 - 36.0 g/dL    RDW 13.6 11.5 - 14.5 %    Platelets 148 (L) 150 - 450 x10*3/uL   Type And Screen    Collection Time: 01/16/24  1:46 PM   Result Value Ref Range    ABO TYPE A     Rh TYPE POS     ANTIBODY SCREEN NEG    Coagulation Screen    Collection Time: 01/16/24  1:46 PM   Result Value Ref Range    Protime 11.1 9.8 - 12.8 seconds    INR 1.0 0.9 - 1.1    aPTT 31 27 - 38 seconds   Fibrinogen    Collection Time: 01/16/24  1:46 PM   Result Value Ref Range    Fibrinogen 457 (H) 200 - 400 mg/dL   Urinalysis with Reflex Microscopic    Collection Time: 01/16/24  2:11 PM   Result Value Ref Range    Color, Urine Yellow Straw, Yellow    Appearance, Urine Hazy (N)  Clear    Specific Gravity, Urine 1.005 1.005 - 1.035    pH, Urine 6.0 5.0, 5.5, 6.0, 6.5, 7.0, 7.5, 8.0    Protein, Urine NEGATIVE NEGATIVE mg/dL    Glucose, Urine NEGATIVE NEGATIVE mg/dL    Blood, Urine NEGATIVE NEGATIVE    Ketones, Urine NEGATIVE NEGATIVE mg/dL    Bilirubin, Urine NEGATIVE NEGATIVE    Urobilinogen, Urine <2.0 <2.0 mg/dL    Nitrite, Urine NEGATIVE NEGATIVE    Leukocyte Esterase, Urine TRACE (A) NEGATIVE   Microscopic Only, Urine    Collection Time: 01/16/24  2:11 PM   Result Value Ref Range    WBC, Urine 1-5 1-5, NONE /HPF    RBC, Urine NONE NONE, 1-2, 3-5 /HPF    Squamous Epithelial Cells, Urine 1-9 (SPARSE) Reference range not established. /HPF    Mucus, Urine 1+ Reference range not established. /LPF   CBC    Collection Time: 01/16/24  4:57 PM   Result Value Ref Range    WBC 8.1 4.4 - 11.3 x10*3/uL    nRBC 0.0 0.0 - 0.0 /100 WBCs    RBC 3.66 (L) 4.00 - 5.20 x10*6/uL    Hemoglobin 11.1 (L) 12.0 - 16.0 g/dL    Hematocrit 33.7 (L) 36.0 - 46.0 %    MCV 92 80 - 100 fL    MCH 30.3 26.0 - 34.0 pg    MCHC 32.9 32.0 - 36.0 g/dL    RDW 13.7 11.5 - 14.5 %    Platelets 151 150 - 450 x10*3/uL   Coagulation Screen    Collection Time: 01/16/24  4:57 PM   Result Value Ref Range    Protime 10.9 9.8 - 12.8 seconds    INR 1.0 0.9 - 1.1    aPTT 30 27 - 38 seconds   Fibrinogen    Collection Time: 01/16/24  4:57 PM   Result Value Ref Range    Fibrinogen 524 (H) 200 - 400 mg/dL   Comprehensive metabolic panel    Collection Time: 01/16/24  4:57 PM   Result Value Ref Range    Glucose 72 (L) 74 - 99 mg/dL    Sodium 136 136 - 145 mmol/L    Potassium 3.9 3.5 - 5.3 mmol/L    Chloride 107 98 - 107 mmol/L    Bicarbonate 20 (L) 21 - 32 mmol/L    Anion Gap 13 10 - 20 mmol/L    Urea Nitrogen 5 (L) 6 - 23 mg/dL    Creatinine 0.39 (L) 0.50 - 1.05 mg/dL    eGFR >90 >60 mL/min/1.73m*2    Calcium 8.7 8.6 - 10.6 mg/dL    Albumin 3.4 3.4 - 5.0 g/dL    Alkaline Phosphatase 105 33 - 110 U/L    Total Protein 6.1 (L) 6.4 - 8.2 g/dL    AST  13 9 - 39 U/L    Bilirubin, Total 0.3 0.0 - 1.2 mg/dL    ALT 8 7 - 45 U/L

## 2024-01-17 ENCOUNTER — APPOINTMENT (OUTPATIENT)
Dept: RADIOLOGY | Facility: HOSPITAL | Age: 34
End: 2024-01-17
Payer: COMMERCIAL

## 2024-01-17 VITALS
TEMPERATURE: 98.6 F | HEART RATE: 95 BPM | SYSTOLIC BLOOD PRESSURE: 118 MMHG | RESPIRATION RATE: 20 BRPM | OXYGEN SATURATION: 96 % | DIASTOLIC BLOOD PRESSURE: 77 MMHG

## 2024-01-17 PROCEDURE — 2500000004 HC RX 250 GENERAL PHARMACY W/ HCPCS (ALT 636 FOR OP/ED): Performed by: STUDENT IN AN ORGANIZED HEALTH CARE EDUCATION/TRAINING PROGRAM

## 2024-01-17 PROCEDURE — 76816 OB US FOLLOW-UP PER FETUS: CPT

## 2024-01-17 PROCEDURE — 76817 TRANSVAGINAL US OBSTETRIC: CPT | Performed by: OBSTETRICS & GYNECOLOGY

## 2024-01-17 PROCEDURE — 76816 OB US FOLLOW-UP PER FETUS: CPT | Performed by: OBSTETRICS & GYNECOLOGY

## 2024-01-17 PROCEDURE — G0378 HOSPITAL OBSERVATION PER HR: HCPCS

## 2024-01-17 PROCEDURE — 76817 TRANSVAGINAL US OBSTETRIC: CPT

## 2024-01-17 PROCEDURE — 99238 HOSP IP/OBS DSCHRG MGMT 30/<: CPT | Performed by: STUDENT IN AN ORGANIZED HEALTH CARE EDUCATION/TRAINING PROGRAM

## 2024-01-17 PROCEDURE — 76819 FETAL BIOPHYS PROFIL W/O NST: CPT | Performed by: OBSTETRICS & GYNECOLOGY

## 2024-01-17 RX ORDER — ONDANSETRON 4 MG/1
4 TABLET, FILM COATED ORAL EVERY 6 HOURS PRN
Status: DISCONTINUED | OUTPATIENT
Start: 2024-01-17 | End: 2024-01-17 | Stop reason: HOSPADM

## 2024-01-17 RX ORDER — LABETALOL HYDROCHLORIDE 5 MG/ML
20 INJECTION, SOLUTION INTRAVENOUS ONCE AS NEEDED
Status: DISCONTINUED | OUTPATIENT
Start: 2024-01-17 | End: 2024-01-17 | Stop reason: HOSPADM

## 2024-01-17 RX ORDER — METOCLOPRAMIDE 10 MG/1
10 TABLET ORAL EVERY 6 HOURS PRN
Status: DISCONTINUED | OUTPATIENT
Start: 2024-01-17 | End: 2024-01-17 | Stop reason: HOSPADM

## 2024-01-17 RX ORDER — METOCLOPRAMIDE HYDROCHLORIDE 5 MG/ML
10 INJECTION INTRAMUSCULAR; INTRAVENOUS EVERY 6 HOURS PRN
Status: DISCONTINUED | OUTPATIENT
Start: 2024-01-17 | End: 2024-01-17 | Stop reason: HOSPADM

## 2024-01-17 RX ORDER — BISACODYL 10 MG/1
10 SUPPOSITORY RECTAL DAILY PRN
Status: DISCONTINUED | OUTPATIENT
Start: 2024-01-17 | End: 2024-01-17 | Stop reason: HOSPADM

## 2024-01-17 RX ORDER — LIDOCAINE HYDROCHLORIDE 10 MG/ML
0.5 INJECTION INFILTRATION; PERINEURAL ONCE AS NEEDED
Status: DISCONTINUED | OUTPATIENT
Start: 2024-01-17 | End: 2024-01-17 | Stop reason: HOSPADM

## 2024-01-17 RX ORDER — NIFEDIPINE 10 MG/1
10 CAPSULE ORAL ONCE AS NEEDED
Status: DISCONTINUED | OUTPATIENT
Start: 2024-01-17 | End: 2024-01-17 | Stop reason: HOSPADM

## 2024-01-17 RX ORDER — POLYETHYLENE GLYCOL 3350 17 G/17G
17 POWDER, FOR SOLUTION ORAL 2 TIMES DAILY PRN
Status: DISCONTINUED | OUTPATIENT
Start: 2024-01-17 | End: 2024-01-17 | Stop reason: HOSPADM

## 2024-01-17 RX ORDER — ONDANSETRON HYDROCHLORIDE 2 MG/ML
4 INJECTION, SOLUTION INTRAVENOUS EVERY 6 HOURS PRN
Status: DISCONTINUED | OUTPATIENT
Start: 2024-01-17 | End: 2024-01-17 | Stop reason: HOSPADM

## 2024-01-17 RX ORDER — ADHESIVE BANDAGE
30 BANDAGE TOPICAL
Status: DISCONTINUED | OUTPATIENT
Start: 2024-01-17 | End: 2024-01-17 | Stop reason: HOSPADM

## 2024-01-17 RX ORDER — HYDRALAZINE HYDROCHLORIDE 20 MG/ML
5 INJECTION INTRAMUSCULAR; INTRAVENOUS ONCE AS NEEDED
Status: DISCONTINUED | OUTPATIENT
Start: 2024-01-17 | End: 2024-01-17 | Stop reason: HOSPADM

## 2024-01-17 RX ORDER — SIMETHICONE 80 MG
80 TABLET,CHEWABLE ORAL 4 TIMES DAILY PRN
Status: DISCONTINUED | OUTPATIENT
Start: 2024-01-17 | End: 2024-01-17 | Stop reason: HOSPADM

## 2024-01-17 RX ADMIN — BETAMETHASONE ACETATE AND BETAMETHASONE SODIUM PHOSPHATE 12 MG: 3; 3 INJECTION, SUSPENSION INTRA-ARTICULAR; INTRALESIONAL; INTRAMUSCULAR; SOFT TISSUE at 18:25

## 2024-01-17 ASSESSMENT — PAIN - FUNCTIONAL ASSESSMENT: PAIN_FUNCTIONAL_ASSESSMENT: 0-10

## 2024-01-17 ASSESSMENT — PAIN SCALES - GENERAL
PAINLEVEL_OUTOF10: 0 - NO PAIN

## 2024-01-17 NOTE — SIGNIFICANT EVENT
Update    Subjective: To bedside to discuss plan of care w patient and . Cramping consistent. No new VB. No ctx, LOF. Good FM.    Objective:  Vitals: /74   Pulse 93   Temp 37 °C (98.6 °F) (Temporal)   Resp 16   LMP 06/05/2023   SpO2 97%     Fetal Assessment  Baseline Fetal Heart Rate (bpm): 120 bpm  Baseline Classification: Normal  Variability: Moderate (Between 6 and 25 BPM)  Pattern: Accelerations  FHR Category: Category I     Contraction Frequency: irritability    Assessment/Plan:  - Not more uncomfortable, no new bleeding. No ctx on toco. FHT cat 1, reassuring. Currently stable  - Continue CEFM on L&D  - Okay to eat  - Discussed likely etiology and plan of care with patient and , express understanding and agreement    D/w Dr. Marck Dumont MD  PGY-3, Obstetrics and Gynecology

## 2024-01-17 NOTE — DISCHARGE SUMMARY
Discharge Summary    Admission Date: 2024  Discharge Date: 2024    Discharge Diagnosis  Placenta previa affecting delivery    Hospital Course  Ellen Crawley is a 33 y.o.  at 32w2d who was admitted for vaginal bleeding and cramping in the setting of a known marginal placenta previa. Notably, this is patient's second bleed this pregnancy. On admission, abruption labs were negative and Hgb was 11.2 which is consistent with patient's baseline. Patient continued to report dark brown spotting throughout admission without evidence of bright red blood. Patient underwent growth US on  which demonstrated appropriate growth (EFW 30%, AC 49%), normal fluid and cervical length, as well as a stable previa. She received BMZ from -. Fetal assessment throughout admission remained reassuring. Following second dose of BMZ , patient was stable and desired discharge. She was discharged home on HD#2 with strict return precautions. She is scheduled to follow up with Dr. Nichols on .       Pertinent Physical Exam At Time of Discharge  Genitourinary:      Vulva normal.   Cardiovascular:      Rate and Rhythm: Normal rate.   Pulmonary:      Effort: Pulmonary effort is normal.      Breath sounds: Normal breath sounds.   Abdominal:      Palpations: Abdomen is soft.      Comments: Gravid, non-tender to palpation  Neurological:      General: No focal deficit present.      Mental Status: She is alert.   Skin:     General: Skin is warm and dry.   Psychiatric:         Mood and Affect: Mood normal.         Behavior: Behavior normal.         Discharge Meds     Your medication list        CONTINUE taking these medications        Instructions Last Dose Given Next Dose Due   Jobst Anti-Embolism Stocking misc  Generic drug: compress.stocking,knee,reg,med      1 PAIR OF JOBST Anti-embolism stockings  medium              STOP taking these medications      prenatal vitamin (iron-folic) 27 mg iron-800 mcg folic acid  tablet                  Complications Requiring Follow-Up  None    Test Results Pending At Discharge  Pending Labs       No current pending labs.            Outpatient Follow-Up  Future Appointments   Date Time Provider Department Center   1/25/2024  9:00 AM MD Henrique Baltazar [facility]   1/29/2024 10:00 AM MAC [id number] OBGYNIMG ULTRASOUND 4 [id number] MAC [facility]   2/8/2024  9:00 AM MD Henrique Baltazar [facility]   2/15/2024  9:00 AM MD Henrique Baltazar [facility]   2/15/2024  2:00 PM Poonam De Jesus MD [id number] [facility]   2/22/2024  9:00 AM MD Henrique Baltazar   2/29/2024  9:15 AM MD Henrique Baltazar   3/7/2024  9:15 AM CHEL Lindsay-JOON Duenas [facility]   3/14/2024  9:00 AM MD Henrique Baltazar         Seen & dw. Dr. Magdalena Nazario MD  PGY-1, Obstetrics & Gynecology   [facility]

## 2024-01-17 NOTE — DISCHARGE SUMMARY
Discharge Summary    Admission Date: 1/16/2024  Discharge Date: 1/17/24      Discharge Diagnosis  Placenta previa affecting delivery    Hospital Course  Delivery Date: This patient has no babies on file. This patient has no babies on file.  Delivery type: This patient has no babies on file.   GA at delivery: 32w2d  Outcome: This patient has no babies on file.  Anesthesia during delivery: This patient has no babies on file.  Intrapartum complications: This patient has no babies on file.  Feeding method:       Procedures: none  Contraception at discharge: none    Hospital Course    Patient admitted overnight for vaginal spotting/ brown discharge in the setting of marginal placenta previa. Patient without jamal bleeding overnight but some cramping. By HD#2 cramping and brown discharge subsided with repeat growth US inpatient showing EFW in the 30% and AC in the 49% and re demonstrated placenta previa. BMZ 1-2 given 1/16-1/17. By the PM of 1/17 patient continuing to feel well, discharged home in stable condition with plan for close outpatient follow up.     Marginal placenta previa, cramping/spotting  - This is second bleed of pregnancy and is larger volume than prior. SSE with scant brown vaginal discharge, no evident active bleeding. [place] irritable, pt reports cramping - c/f abruption. Abruption labs wnl x2 and Hgb 11 from 12 on last admission. Given clinical context, patient admitted overnight for observation  - Cervix visually closed, no ctx, low c/f PTL  - Cramping improved this morning, scant brown discharge with wiping  - Repeat US 1/17: EFW 30% 1871g, AC 49%, low lying Placenta previa noted on US  - No active bleeding or ctx at this time, no indication for delivery currently  - BMZ #1 given on admission, for BMZ #2 at 1800     IUP  - CEFM on L&D, cat 1 currently  - Last growth 12/29: EFW 1371g (29%), AC 51%. BSUS 1/16: EFW 1750g (19%), AC 16%. MARILUZ wnl, BPP 8/8  - BMZ as above  - UTD on PNC     Maternal  well-being  - Anxiety, no meds  - HA, normotensive, treat PRN     Dispo: Observation on Mac 4 to confirm resolution of VB. Will plan for BMZ  #2 tonight and possible discharge the evening if stable.    Pertinent Physical Exam At Time of Discharge  Physical Exam  Vitals and nursing note reviewed.   Constitutional:       Appearance: Normal appearance.   HENT:      Head: Normocephalic and atraumatic.      Nose: Nose normal.      Mouth/Throat:      Mouth: Mucous membranes are moist.      Pharynx: No oropharyngeal exudate or posterior oropharyngeal erythema.   Eyes:      Extraocular Movements: Extraocular movements intact.      Conjunctiva/sclera: Conjunctivae normal.   Pulmonary:      Effort: Pulmonary effort is normal.   Chest:      Chest wall: No deformity or swelling.   Breasts:     Breasts are symmetrical.      Right: Normal.      Left: Normal.   Abdominal:      General: There is no distension.      Palpations: Abdomen is soft. There is no mass.      Tenderness: There is no abdominal tenderness.   Genitourinary:     General: Normal vulva.      Vagina: Normal.      Cervix: Normal.      Uterus: Normal.       Adnexa: Right adnexa normal and left adnexa normal.      Rectum: Normal.   Musculoskeletal:      Cervical back: Normal range of motion.   Skin:     General: Skin is warm and dry.      Findings: No bruising, erythema, lesion or rash.   Neurological:      General: No focal deficit present.      Mental Status: She is alert.   Psychiatric:         Mood and Affect: Mood normal.         Behavior: Behavior normal.         Thought Content: Thought content normal.         Judgment: Judgment normal.           Discharge Meds     Your medication list        CONTINUE taking these medications        Instructions Last Dose Given Next Dose Due   Jobst Anti-Embolism Stocking misc  Generic drug: compress.stocking,knee,reg,med      1 PAIR OF JOBST Anti-embolism stockings  medium       prenatal vitamin (iron-folic) 27 mg iron-800 mcg  folic acid tablet                     Complications Requiring Follow-Up  Placenta Previa    Test Results Pending At Discharge  Pending Labs       No current pending labs.            Outpatient Follow-Up  Future Appointments   Date Time Provider Department Center   1/25/2024  9:00 AM MD Henrique Baltazar [facility]   1/29/2024 10:00 AM MAC [id number] OBGYNIMG ULTRASOUND 4 [id number] MAC Risman P   2/8/2024  9:00 AM MD Henrique Baltazar [facility]   2/15/2024  9:00 AM MD Henrique Baltazar [facility]   2/15/2024  2:00 PM Poonam De Jesus MD [id number] [facility]   2/22/2024  9:00 AM MD Lilian [id number] [facility]   2/29/2024  9:15 AM MD Lilian [id number] [facility]   3/7/2024  9:15 AM JESSI Lindsay [facility]   3/14/2024  9:00 AM MD Henrique Baltazar [facility]           Javy Arenas MD

## 2024-01-17 NOTE — CONSULTS
Obstetrical Consult    Reason for Consult: Vaginal Bleeding, Marginal Placenta Previa    Assessment/Plan      Marginal placenta previa, cramping/spotting  - This is second bleed of pregnancy and is larger volume than prior. SSE with scant brown vaginal discharge, no evident active bleeding. [place] irritable, pt reports cramping - c/f abruption. Abruption labs wnl x2 and Hgb 11 from 12 on last admission. Given clinical context, patient admitted overnight for observation  - Cervix visually closed, no ctx, low c/f PTL  - Cramping improved this morning, scant brown discharge with wiping  - Repeat US : EFW 30% 1871g, AC 49%, low lying Placenta previa noted on US  - No active bleeding or ctx at this time, no indication for delivery currently  - BMZ #1 given on admission, for BMZ #2 at 1800    IUP  - CEFM on L&D, cat 1 currently  - Last growth : EFW 1371g (29%), AC 51%. BSUS : EFW 1750g (19%), AC 16%. MARILUZ wnl, BPP /  - BMZ as above  - UTD on PNC     Maternal well-being  - Anxiety, no meds  - HA, normotensive, treat PRN    Dispo: Observation on Mac 4 to confirm resolution of VB. Will plan for BMZ  #2 tonight and possible discharge the evening if stable.    D/w Dr. Az Arenas MD PGY-3  [facility] Pager [phone]  Phone: [phone]        Subjective   Interval History    Patient feeling well at bedside this AM. Still feeling some cramping but otherwise no VB. Notes good FM. Denies LOF or discharge changes.     Admission HPI    Ellen Crawley is a 33 y.o.  at 32w1d by LMP cw 7w us presents for cramping and spotting in s/o marginal previa      Patient was very active yesterday and overnight started having some cramping, thought it might be related to gas pain initially but around 4am noted some brown vaginal discharge and then later a dark brown/black clump of discharge she was concerned could represent a blood clot. Her cramping now feels like period cramps. Feels very anxious, Good fetal movement, no loss  of fluid.      Patient has had 2 prior presentations to triage for similar complaints, once with pink while voiding, the second with brown discharge. On both occasions she was evaluated in triage and discharged home, has never received BMZ this pregnancy.     Today on presentation to [facility] she was stable with fibrinogen 457, coags wnl, and hgb 11.2. She is Rh positive. [place] [facility]      Pregnancy notable for:   - Marginal placenta previa (edge at but not extending beyond internal os ), last evaluated at 29.4wks   - History of PPH with prior delivery ()  - Anxiety, no meds  - Hx of PTD x2 @ 36wks (spontaneous and one for oligo), both     Obstetrical History   OB History    Para Term  AB Living   4 2 2   1 2   SAB IAB Ectopic Multiple Live Births   1       1      # Outcome Date GA Lbr Tony/2nd Weight Sex Delivery Anes PTL Lv   4 Current            3 Term 17 36w0d  3.402 kg M Vag-Spont None N    2 Term 14 36w0d  2.778 kg M Vag-Spont None N IVAN   1 SAB                Past Medical History  Past Medical History:   Diagnosis Date    Personal history of other diseases of the respiratory system 2021    History of acute bacterial sinusitis    Unspecified amblyopia, left eye     Amblyopia of left eye    Urinary tract infection         Past Surgical History   Past Surgical History:   Procedure Laterality Date    DILATION AND CURETTAGE OF UTERUS N/A 10/12/2022    OTHER SURGICAL HISTORY  02/10/2021    Abscess incision and drainage       Social History  Social History     Tobacco Use    Smoking status: Never     Passive exposure: Never    Smokeless tobacco: Never   Substance Use Topics    Alcohol use: Not Currently     Substance and Sexual Activity   Drug Use Never       Allergies  Clindamycin     Medications  Medications Prior to Admission   Medication Sig Dispense Refill Last Dose    prenatal vitamin, iron-folic, (prenatal vit  no.130-iron-folic) 27 mg iron-800 mcg folic acid tablet Take 1 tablet by mouth once daily.   1/16/2024 at 0900    compress.stocking,knee,reg,med (Jobst Anti-Embolism Stocking) misc 1 PAIR OF JOBST Anti-embolism stockings  medium 1 each 0 Past Month at 0900       Objective    Last Vitals  Temp Pulse Resp BP MAP O2 Sat   37.3 °C (99.1 °F) 96 18 115/61   96 %     Physical Examination  GENERAL: Examination reveals a well developed, well nourished, gravid female in no acute distress. She is alert and cooperative.  HEENT: PERRLA. External ears normal. Nose normal, no erythema or discharge. Mouth and throat clear.  LUNGS: clear to auscultation bilaterally  HEART: regular rate and rhythm, S1, S2 normal, no murmur, click, rub or gallop  BREASTS: breasts appear normal for pregnancy, no suspicious masses, no skin or nipple changes or axillary nodes  ABDOMEN: soft, gravid, nontender, nondistended, no abnormal masses, no epigastric pain  Current Estimated Fetal Weight 1871g established by ultrasound  EXTREMITIES: no redness or tenderness in the calves or thighs, no edema  SKIN: normal coloration and turgor, no rashes  NEUROLOGICAL: alert, oriented, normal speech, no focal findings or movement disorder noted  PSYCHOLOGICAL: awake and alert; oriented to person, place, and time    Lab Review  Lab Results   Component Value Date    ABO A 01/16/2024    LABRH POS 01/16/2024    ABSCRN NEG 01/16/2024     Lab Results   Component Value Date    WBC 8.1 01/16/2024    HGB 11.1 (L) 01/16/2024    HCT 33.7 (L) 01/16/2024     01/16/2024     Lab Results   Component Value Date    GLUCOSE 72 (L) 01/16/2024     01/16/2024    K 3.9 01/16/2024     01/16/2024    CO2 20 (L) 01/16/2024    ANIONGAP 13 01/16/2024    BUN 5 (L) 01/16/2024    CREATININE 0.39 (L) 01/16/2024    EGFR >90 01/16/2024    CALCIUM 8.7 01/16/2024    ALBUMIN 3.4 01/16/2024    PROT 6.1 (L) 01/16/2024    ALKPHOS 105 01/16/2024    ALT 8 01/16/2024    AST 13 01/16/2024     "BILITOT 0.3 01/16/2024     No results found for: \"UTPCR\"  Lab Results   Component Value Date    APTT 30 01/16/2024    PROTIME 10.9 01/16/2024    INR 1.0 01/16/2024     "

## 2024-01-17 NOTE — HOSPITAL COURSE
Hospital Course    Patient admitted overnight for vaginal spotting/ brown discharge in the setting of marginal placenta previa. Patient without jamal bleeding overnight but some cramping. By HD#2 cramping and brown discharge subsided with repeat growth US inpatient showing EFW in the 30% and AC in the 49% and re demonstrated placenta previa. BMZ 1-2 given 1/16-1/17. By the PM of 1/17 patient continuing to feel well, discharged home in stable condition with plan for close outpatient follow up.     Marginal placenta previa, cramping/spotting  - This is second bleed of pregnancy and is larger volume than prior. SSE with scant brown vaginal discharge, no evident active bleeding. [place] irritable, pt reports cramping - c/f abruption. Abruption labs wnl x2 and Hgb 11 from 12 on last admission. Given clinical context, patient admitted overnight for observation  - Cervix visually closed, no ctx, low c/f PTL  - Cramping improved this morning, scant brown discharge with wiping  - Repeat US 1/17: EFW 30% 1871g, AC 49%, low lying Placenta previa noted on US  - No active bleeding or ctx at this time, no indication for delivery currently  - BMZ #1 given on admission, for BMZ #2 at 1800     IUP  - CEFM on L&D, cat 1 currently  - Last growth 12/29: EFW 1371g (29%), AC 51%. BSUS 1/16: EFW 1750g (19%), AC 16%. MARILUZ wnl, BPP 8/8  - BMZ as above  - UTD on PNC     Maternal well-being  - Anxiety, no meds  - HA, normotensive, treat PRN     Dispo: Observation on Mac 4 to confirm resolution of VB. Will plan for BMZ  #2 tonight and possible discharge the evening if stable.

## 2024-01-17 NOTE — SIGNIFICANT EVENT
Update    Subjective: Intermittent cramping, no new VB    Objective:  Vitals: /57   Pulse 94   Temp 36.9 °C (98.4 °F) (Temporal)   Resp 18   LMP 06/05/2023   SpO2 96%     Fetal Assessment  Baseline Fetal Heart Rate (bpm): 120 bpm  Baseline Classification: Normal  Variability: Moderate (Between 6 and 25 BPM)  Pattern: Accelerations  FHR Category: Category I     Contraction Frequency: irritability    Assessment/Plan:  - Not more uncomfortable, no new bleeding. No ctx on toco. FHT cat 1, reassuring. Currently stable  - Okay to transfer to Mac 4  - For US in AM and BMZ #2 this PM    D/w Dr. Cedric Dumont MD  PGY-3, Obstetrics and Gynecology

## 2024-01-17 NOTE — CARE PLAN
The patient's goals for the shift include no bleeding    The clinical goals for the shift include vss and minimal bleeding    Over the shift, the patient did  make progress toward the following goals. Barriers to progression include none. Recommendations to address these barriers include encouraging the patient to keep track of her bowel movements..

## 2024-01-22 DIAGNOSIS — K21.9 GASTROESOPHAGEAL REFLUX DISEASE WITHOUT ESOPHAGITIS: Primary | ICD-10-CM

## 2024-01-22 RX ORDER — LANSOPRAZOLE 30 MG/1
30 TABLET, ORALLY DISINTEGRATING, DELAYED RELEASE ORAL DAILY
Qty: 30 TABLET | Refills: 2 | Status: SHIPPED | OUTPATIENT
Start: 2024-01-22 | End: 2024-04-21

## 2024-01-25 ENCOUNTER — PREP FOR PROCEDURE (OUTPATIENT)
Dept: OBSTETRICS AND GYNECOLOGY | Facility: HOSPITAL | Age: 34
End: 2024-01-25

## 2024-01-25 ENCOUNTER — ROUTINE PRENATAL (OUTPATIENT)
Dept: OBSTETRICS AND GYNECOLOGY | Facility: CLINIC | Age: 34
End: 2024-01-25
Payer: COMMERCIAL

## 2024-01-25 VITALS — WEIGHT: 148 LBS | BODY MASS INDEX: 24.63 KG/M2 | DIASTOLIC BLOOD PRESSURE: 70 MMHG | SYSTOLIC BLOOD PRESSURE: 130 MMHG

## 2024-01-25 DIAGNOSIS — O09.899 RUBELLA NON-IMMUNE STATUS, ANTEPARTUM (HHS-HCC): ICD-10-CM

## 2024-01-25 DIAGNOSIS — O26.893 PREGNANCY HEADACHE IN THIRD TRIMESTER (HHS-HCC): ICD-10-CM

## 2024-01-25 DIAGNOSIS — K21.9 GASTROESOPHAGEAL REFLUX DISEASE WITHOUT ESOPHAGITIS: ICD-10-CM

## 2024-01-25 DIAGNOSIS — O23.41 URINARY TRACT INFECTION IN MOTHER DURING FIRST TRIMESTER OF PREGNANCY (HHS-HCC): ICD-10-CM

## 2024-01-25 DIAGNOSIS — O44.03 PLACENTA PREVIA IN THIRD TRIMESTER (HHS-HCC): Primary | ICD-10-CM

## 2024-01-25 DIAGNOSIS — Z28.39 RUBELLA NON-IMMUNE STATUS, ANTEPARTUM (HHS-HCC): ICD-10-CM

## 2024-01-25 DIAGNOSIS — O35.9XX1 FETAL ABNORMALITY AFFECTING MANAGEMENT OF MOTHER, FETUS 1 OF MULTIPLE GESTATION (HHS-HCC): ICD-10-CM

## 2024-01-25 DIAGNOSIS — O44.00: ICD-10-CM

## 2024-01-25 DIAGNOSIS — Z34.83 PRENATAL CARE, SUBSEQUENT PREGNANCY IN THIRD TRIMESTER (HHS-HCC): ICD-10-CM

## 2024-01-25 DIAGNOSIS — R51.9 PREGNANCY HEADACHE IN THIRD TRIMESTER (HHS-HCC): ICD-10-CM

## 2024-01-25 PROCEDURE — 99213 OFFICE O/P EST LOW 20 MIN: CPT | Performed by: OBSTETRICS & GYNECOLOGY

## 2024-01-25 RX ORDER — METOCLOPRAMIDE 10 MG/1
10 TABLET ORAL 3 TIMES DAILY
Qty: 30 TABLET | Refills: 0 | Status: SHIPPED | OUTPATIENT
Start: 2024-01-25 | End: 2024-02-21 | Stop reason: HOSPADM

## 2024-01-25 NOTE — PROGRESS NOTES
Subjective   Patient ID [id number]   Ellen Crawley is a 33 y.o.  at 33w3d with a working estimated date of delivery of 3/11/2024, by Last Menstrual Period who presents for a routine prenatal visit. She denies vaginal bleeding, leakage of fluid, decreased fetal movements. Complaint of [place] contractions and pelvic pressure. Having anxiety because of this. Timed them to 1-3 minutes. Noticing some increase in discharge.   Complaint of persistent headache. Has tried rest, hydration. Does not feel like Tylenol helps.   Also complaint of heartburn. Does not feel relief with Tums.     Her pregnancy is complicated by:  Placenta previa- most recent ultrasound still shows placental edge over the cervical os. Hospitalized - for spotting (second time.) Given betamethasone. Discussed delivery timing.     Objective   Physical Exam:   Weight: 67.1 kg (148 lb)  Expected Total Weight Gain: 11.5 kg (25 lb)-16 kg (35 lb)   Pregravid BMI: 20.14  BP: 130/70  Fetal Heart Rate: 161 Fundal Height (cm): 32 cm             Prenatal Labs  Urine Dip:  Lab Results   Component Value Date    KETONESU NEGATIVE 2024    GLUCOSEUR NEGATIVE 2024    LEUKOCYTESUR (3+) Moderate 2024     Lab Results   Component Value Date    HGB 11.1 (L) 2024    HCT 33.7 (L) 2024    ABO A 2024    HEPBSAG NONREACTIVE 2023       Imaging  The most recent ultrasound was performed on 24  with a study GA of  32.2 and EFW of 1871 gms (30%ile) .        Assessment/Plan   Problem List Items Addressed This Visit             ICD-10-CM    Prenatal care, subsequent pregnancy in third trimester Z34.83    Urinary tract infection in mother during first trimester of pregnancy O23.41    Rubella non-immune status, antepartum O09.899, Z28.39    Placenta previa in third trimester - Primary O44.03    Fetal abnormality affecting management of mother O35.9XX0    Placenta previa affecting delivery O44.00     Other Visit Diagnoses          Codes    Gastroesophageal reflux disease without esophagitis     K21.9    Relevant Medications    metoclopramide (Reglan) 10 mg tablet    Pregnancy headache in third trimester     O26.893, R51.9          Continue prenatal vitamin.  Labs reviewed. Next Ultrasound scheduled.   Schedule  at 36 weeks  Expected mode of delivery .  Follow up in 1 week for a routine prenatal visit.

## 2024-01-29 ENCOUNTER — APPOINTMENT (OUTPATIENT)
Dept: RADIOLOGY | Facility: CLINIC | Age: 34
End: 2024-01-29
Payer: COMMERCIAL

## 2024-02-04 ENCOUNTER — HOSPITAL ENCOUNTER (OUTPATIENT)
Facility: HOSPITAL | Age: 34
Discharge: HOME | End: 2024-02-04
Attending: OBSTETRICS & GYNECOLOGY | Admitting: OBSTETRICS & GYNECOLOGY
Payer: COMMERCIAL

## 2024-02-04 VITALS
BODY MASS INDEX: 24.54 KG/M2 | DIASTOLIC BLOOD PRESSURE: 76 MMHG | OXYGEN SATURATION: 97 % | HEART RATE: 79 BPM | HEIGHT: 65 IN | SYSTOLIC BLOOD PRESSURE: 126 MMHG | WEIGHT: 147.27 LBS | TEMPERATURE: 97.9 F | RESPIRATION RATE: 16 BRPM

## 2024-02-04 PROBLEM — Z3A.34 34 WEEKS GESTATION OF PREGNANCY (HHS-HCC): Status: ACTIVE | Noted: 2024-02-04

## 2024-02-04 PROBLEM — O36.8130 DECREASED FETAL MOVEMENTS IN THIRD TRIMESTER (HHS-HCC): Status: ACTIVE | Noted: 2024-02-04

## 2024-02-04 PROCEDURE — 99214 OFFICE O/P EST MOD 30 MIN: CPT | Mod: 25

## 2024-02-04 PROCEDURE — 99213 OFFICE O/P EST LOW 20 MIN: CPT

## 2024-02-04 PROCEDURE — 59025 FETAL NON-STRESS TEST: CPT

## 2024-02-04 SDOH — SOCIAL STABILITY: SOCIAL INSECURITY: DO YOU FEEL ANYONE HAS EXPLOITED OR TAKEN ADVANTAGE OF YOU FINANCIALLY OR OF YOUR PERSONAL PROPERTY?: NO

## 2024-02-04 SDOH — SOCIAL STABILITY: SOCIAL INSECURITY: ARE YOU OR HAVE YOU BEEN THREATENED OR ABUSED PHYSICALLY, EMOTIONALLY, OR SEXUALLY BY ANYONE?: NO

## 2024-02-04 SDOH — SOCIAL STABILITY: SOCIAL INSECURITY: HAS ANYONE EVER THREATENED TO HURT YOUR FAMILY OR YOUR PETS?: NO

## 2024-02-04 SDOH — ECONOMIC STABILITY: HOUSING INSECURITY: DO YOU FEEL UNSAFE GOING BACK TO THE PLACE WHERE YOU ARE LIVING?: NO

## 2024-02-04 SDOH — SOCIAL STABILITY: SOCIAL INSECURITY: ARE THERE ANY APPARENT SIGNS OF INJURIES/BEHAVIORS THAT COULD BE RELATED TO ABUSE/NEGLECT?: NO

## 2024-02-04 SDOH — SOCIAL STABILITY: SOCIAL INSECURITY: ABUSE SCREEN: ADULT

## 2024-02-04 SDOH — HEALTH STABILITY: MENTAL HEALTH: SUICIDAL BEHAVIOR (LIFETIME): NO

## 2024-02-04 SDOH — SOCIAL STABILITY: SOCIAL INSECURITY: DOES ANYONE TRY TO KEEP YOU FROM HAVING/CONTACTING OTHER FRIENDS OR DOING THINGS OUTSIDE YOUR HOME?: NO

## 2024-02-04 SDOH — HEALTH STABILITY: MENTAL HEALTH: WERE YOU ABLE TO COMPLETE ALL THE BEHAVIORAL HEALTH SCREENINGS?: YES

## 2024-02-04 SDOH — SOCIAL STABILITY: SOCIAL INSECURITY: PHYSICAL ABUSE: DENIES

## 2024-02-04 SDOH — SOCIAL STABILITY: SOCIAL INSECURITY: VERBAL ABUSE: DENIES

## 2024-02-04 SDOH — HEALTH STABILITY: MENTAL HEALTH: WISH TO BE DEAD (PAST 1 MONTH): NO

## 2024-02-04 SDOH — SOCIAL STABILITY: SOCIAL INSECURITY: HAVE YOU HAD THOUGHTS OF HARMING ANYONE ELSE?: NO

## 2024-02-04 SDOH — HEALTH STABILITY: MENTAL HEALTH: NON-SPECIFIC ACTIVE SUICIDAL THOUGHTS (PAST 1 MONTH): NO

## 2024-02-04 ASSESSMENT — PAIN SCALES - GENERAL: PAINLEVEL_OUTOF10: 0 - NO PAIN

## 2024-02-04 ASSESSMENT — LIFESTYLE VARIABLES
HOW MANY STANDARD DRINKS CONTAINING ALCOHOL DO YOU HAVE ON A TYPICAL DAY: PATIENT DOES NOT DRINK
AUDIT-C TOTAL SCORE: 0
HOW OFTEN DO YOU HAVE 6 OR MORE DRINKS ON ONE OCCASION: NEVER
AUDIT-C TOTAL SCORE: 0
HOW OFTEN DO YOU HAVE A DRINK CONTAINING ALCOHOL: NEVER
SKIP TO QUESTIONS 9-10: 1

## 2024-02-04 NOTE — H&P
Obstetrical OB TRIAGE History and Physical     Ellen Crawley is a 33 y.o.  at 34.6 wga by LMP, c/w 7.0 week US presenting to OB triage for decreased fetal movement    Chief Complaint: Decreased Fetal Movement    Assessment/Plan      Decreased fetal movement  - 1 hr of category I tracing, reactive NST  - normal fetal movement in triage  - denies vaginal bleeding in s/o previa  - continue routine prenatal care, next appt   - pCS scheduled for   - Return precautions reviewed, patient verbalizes understanding     Maternal Well-being  - Vital signs stable and WNL  - All questions and concerns addressed     Dispo  - patient appropriate for d/c home, agrees with plan  - f/u at next scheduled OB appt or to triage sooner as needed     Discussed plan and reviewed tracing with Dr. Ousmane Wood, APRN-CNP      Active Problems:    Placenta previa in third trimester    Decreased fetal movements in third trimester    34 weeks gestation of pregnancy      Pregnancy Problems (from 23 to present)       Problem Noted Resolved    Placenta previa affecting delivery 2024 by Geraldine Dumont MD No    Priority:  Medium      Placenta previa in third trimester 10/12/2023 by Brisa iNchols MD No    Priority:  Medium      Overview Addendum 2024 10:16 AM by Brisa Nichols MD     Monitor with Ultrasounds  USN  Marginal previa.   Hospitalized with bleeding -24         Fetal abnormality affecting management of mother 10/12/2023 by Brisa Nichols MD No    Priority:  Medium      Overview Addendum 2023  9:55 AM by Brisa Nichols MD     Did testing with outside lab which showed male gender. Ultrasound shows female gender. Will send for PreQuel testing.   Did see genetics for counseling on 10/11/23  PreQuel shows normal XX          Prenatal care, subsequent pregnancy in third trimester 10/11/2023 by Brisa Nichols MD No    Priority:  Medium       Overview Addendum 2023 10:25 AM by Brisa Nichols MD     Declined genetics  Hx PP hemorrhage.          Urinary tract infection in mother during first trimester of pregnancy 10/11/2023 by Brisa Nichols MD No    Priority:  Medium      Overview Signed 10/11/2023  7:25 AM by Brisa Nichols MD     Treated with Macrobid         Rubella non-immune status, antepartum 10/11/2023 by Brisa Nichols MD No    Priority:  Medium      Overview Signed 10/11/2023  7:26 AM by Brisa Nichols MD     Rubella equivocal. Needs MMR postpartum.               Subjective   Hughes is here complaining of decreased fetal movement. Decreased fetal movement. Denies vaginal bleeding., C/O of occasional contractions., Denies leaking of fluid.      Decreased fetal movement today. No movement this morning, then fewer movements than normal this afternoon. Patient drank juice with no improvement in movements. In OB triage, feeling normal fetal movement. Contractions are rare, one in OB triage.     Obstetrical History   OB History    Para Term  AB Living   4 2 2   1 2   SAB IAB Ectopic Multiple Live Births   1       1      # Outcome Date GA Lbr Tony/2nd Weight Sex Delivery Anes PTL Lv   4 Current            3 Term 17 36w0d  3.402 kg M Vag-Spont None N    2 Term 14 36w0d  2.778 kg M Vag-Spont None N IVAN   1 SAB                Past Medical History  Past Medical History:   Diagnosis Date    Personal history of other diseases of the respiratory system 2021    History of acute bacterial sinusitis    Unspecified amblyopia, left eye     Amblyopia of left eye    Urinary tract infection         Past Surgical History   Past Surgical History:   Procedure Laterality Date    DILATION AND CURETTAGE OF UTERUS N/A 10/12/2022    OTHER SURGICAL HISTORY  02/10/2021    Abscess incision and drainage       Social History  Social History     Tobacco Use    Smoking status: Never     Passive exposure: Never     Smokeless tobacco: Never   Substance Use Topics    Alcohol use: Not Currently     Substance and Sexual Activity   Drug Use Never       Allergies  Clindamycin     Medications  Medications Prior to Admission   Medication Sig Dispense Refill Last Dose    compress.stocking,knee,reg,med (Jobst Anti-Embolism Stocking) misc 1 PAIR OF JOBST Anti-embolism stockings  medium 1 each 0     lansoprazole (Prevacid SoluTab) 30 mg disintegrating tablet Take 1 tablet (30 mg) by mouth once daily. Dissolve on tongue before swallowing particles; do not chew, cut, break, or swallow whole. 30 tablet 2     metoclopramide (Reglan) 10 mg tablet Take 1 tablet (10 mg) by mouth 3 times a day for 10 days. 30 tablet 0     prenatal vitamin, iron-folic, (prenatal vit no.130-iron-folic) 27 mg iron-800 mcg folic acid tablet Take 1 tablet by mouth once daily.          Objective    Last Vitals  Temp Pulse Resp BP MAP O2 Sat   36.6 °C (97.9 °F) 79 16 126/76   97 %     Physical Examination  FHR is 130 , with Accelerations, and a category I  tracing.    [place] readin ctx  General: Examination reveals a well developed, well nourished, female, in no acute distress. She is alert and cooperative.  Lungs: symmetrical, non-labored breathing.  Cardiac: warm, well-perfused.  Abdomen: soft, non-tender, gravid.  Extremities: no redness or tenderness in the calves or thighs.  Neurological: alert, oriented, normal speech, no focal findings or movement disorder noted.     Lab Review  Labs in chart were reviewed.

## 2024-02-05 ENCOUNTER — HOSPITAL ENCOUNTER (OUTPATIENT)
Dept: RADIOLOGY | Facility: HOSPITAL | Age: 34
Discharge: HOME | End: 2024-02-05
Payer: COMMERCIAL

## 2024-02-05 DIAGNOSIS — Z32.01 PREGNANCY TEST POSITIVE (HHS-HCC): ICD-10-CM

## 2024-02-05 PROCEDURE — 76817 TRANSVAGINAL US OBSTETRIC: CPT | Performed by: OBSTETRICS & GYNECOLOGY

## 2024-02-05 PROCEDURE — 76816 OB US FOLLOW-UP PER FETUS: CPT | Performed by: OBSTETRICS & GYNECOLOGY

## 2024-02-05 PROCEDURE — 76819 FETAL BIOPHYS PROFIL W/O NST: CPT | Performed by: OBSTETRICS & GYNECOLOGY

## 2024-02-05 PROCEDURE — 76817 TRANSVAGINAL US OBSTETRIC: CPT

## 2024-02-05 PROCEDURE — 76816 OB US FOLLOW-UP PER FETUS: CPT

## 2024-02-08 ENCOUNTER — ROUTINE PRENATAL (OUTPATIENT)
Dept: OBSTETRICS AND GYNECOLOGY | Facility: CLINIC | Age: 34
End: 2024-02-08
Payer: COMMERCIAL

## 2024-02-08 VITALS — DIASTOLIC BLOOD PRESSURE: 72 MMHG | BODY MASS INDEX: 24.96 KG/M2 | SYSTOLIC BLOOD PRESSURE: 108 MMHG | WEIGHT: 150 LBS

## 2024-02-08 DIAGNOSIS — O36.8131 DECREASED FETAL MOVEMENTS IN THIRD TRIMESTER, FETUS 1 OF MULTIPLE GESTATION (HHS-HCC): ICD-10-CM

## 2024-02-08 DIAGNOSIS — O23.41 URINARY TRACT INFECTION IN MOTHER DURING FIRST TRIMESTER OF PREGNANCY (HHS-HCC): ICD-10-CM

## 2024-02-08 DIAGNOSIS — O44.00: ICD-10-CM

## 2024-02-08 DIAGNOSIS — O09.899 RUBELLA NON-IMMUNE STATUS, ANTEPARTUM (HHS-HCC): ICD-10-CM

## 2024-02-08 DIAGNOSIS — Z28.39 RUBELLA NON-IMMUNE STATUS, ANTEPARTUM (HHS-HCC): ICD-10-CM

## 2024-02-08 DIAGNOSIS — Z34.83 PRENATAL CARE, SUBSEQUENT PREGNANCY IN THIRD TRIMESTER (HHS-HCC): ICD-10-CM

## 2024-02-08 DIAGNOSIS — O44.03 PLACENTA PREVIA IN THIRD TRIMESTER (HHS-HCC): ICD-10-CM

## 2024-02-08 DIAGNOSIS — O35.9XX1 FETAL ABNORMALITY AFFECTING MANAGEMENT OF MOTHER, FETUS 1 OF MULTIPLE GESTATION (HHS-HCC): ICD-10-CM

## 2024-02-08 DIAGNOSIS — Z3A.36 36 WEEKS GESTATION OF PREGNANCY (HHS-HCC): Primary | ICD-10-CM

## 2024-02-08 PROCEDURE — 87081 CULTURE SCREEN ONLY: CPT

## 2024-02-08 PROCEDURE — 99213 OFFICE O/P EST LOW 20 MIN: CPT | Performed by: OBSTETRICS & GYNECOLOGY

## 2024-02-08 NOTE — PROGRESS NOTES
Subjective   Patient ID [id number]   Ellen Crawley is a 33 y.o.  at 35w3d with a working estimated date of delivery of 3/11/2024, by Last Menstrual Period who presents for a routine prenatal visit. She denies vaginal bleeding, leakage of fluid, decreased fetal movements, or contractions.    Her pregnancy is complicated by:  Placenta previa    Objective   Physical Exam:   Weight: 68 kg (150 lb)  Expected Total Weight Gain: 11.5 kg (25 lb)-16 kg (35 lb)   Pregravid BMI: 20.14  BP: 108/72                Prenatal Labs  Urine Dip:  Lab Results   Component Value Date    KETONESU NEGATIVE 2024    GLUCOSEUR NEGATIVE 2024    LEUKOCYTESUR (3+) Moderate 2024     Lab Results   Component Value Date    HGB 11.1 (L) 2024    HCT 33.7 (L) 2024    ABO A 2024    HEPBSAG NONREACTIVE 2023       Imaging  The most recent ultrasound was performed on  24 with a study GA of 35.0 weeks  and EFW of 2269 gms (17%ile) .        Assessment/Plan   Problem List Items Addressed This Visit             ICD-10-CM    Prenatal care, subsequent pregnancy in third trimester Z34.83    Urinary tract infection in mother during first trimester of pregnancy O23.41    Rubella non-immune status, antepartum O09.899, Z28.39    Placenta previa in third trimester O44.03    Fetal abnormality affecting management of mother O35.9XX0    Placenta previa affecting delivery O44.00    Decreased fetal movements in third trimester O36.8130    36 weeks gestation of pregnancy Z3A.36     Continue prenatal vitamin.  Labs reviewed.  GBS taken.  Expected mode of delivery   Follow up in 1 week for a routine prenatal visit.

## 2024-02-11 LAB — GP B STREP GENITAL QL CULT: NORMAL

## 2024-02-15 ENCOUNTER — LAB (OUTPATIENT)
Dept: LAB | Facility: LAB | Age: 34
End: 2024-02-15
Payer: COMMERCIAL

## 2024-02-15 ENCOUNTER — ROUTINE PRENATAL (OUTPATIENT)
Dept: OBSTETRICS AND GYNECOLOGY | Facility: CLINIC | Age: 34
End: 2024-02-15
Payer: COMMERCIAL

## 2024-02-15 ENCOUNTER — DOCUMENTATION (OUTPATIENT)
Dept: OBSTETRICS AND GYNECOLOGY | Facility: CLINIC | Age: 34
End: 2024-02-15

## 2024-02-15 VITALS — WEIGHT: 150 LBS | DIASTOLIC BLOOD PRESSURE: 70 MMHG | SYSTOLIC BLOOD PRESSURE: 108 MMHG | BODY MASS INDEX: 24.96 KG/M2

## 2024-02-15 DIAGNOSIS — O44.03 PLACENTA PREVIA IN THIRD TRIMESTER (HHS-HCC): ICD-10-CM

## 2024-02-15 DIAGNOSIS — Z28.39 RUBELLA NON-IMMUNE STATUS, ANTEPARTUM (HHS-HCC): ICD-10-CM

## 2024-02-15 DIAGNOSIS — Z3A.36 36 WEEKS GESTATION OF PREGNANCY (HHS-HCC): ICD-10-CM

## 2024-02-15 DIAGNOSIS — Z34.83 PRENATAL CARE, SUBSEQUENT PREGNANCY IN THIRD TRIMESTER (HHS-HCC): ICD-10-CM

## 2024-02-15 DIAGNOSIS — O44.00: ICD-10-CM

## 2024-02-15 DIAGNOSIS — O23.41 URINARY TRACT INFECTION IN MOTHER DURING FIRST TRIMESTER OF PREGNANCY (HHS-HCC): ICD-10-CM

## 2024-02-15 DIAGNOSIS — O09.899 RUBELLA NON-IMMUNE STATUS, ANTEPARTUM (HHS-HCC): ICD-10-CM

## 2024-02-15 PROBLEM — N39.0 FREQUENT URINARY TRACT INFECTIONS: Status: RESOLVED | Noted: 2024-02-15 | Resolved: 2024-02-15

## 2024-02-15 LAB
ABO GROUP (TYPE) IN BLOOD: NORMAL
ANTIBODY SCREEN: NORMAL
ERYTHROCYTE [DISTWIDTH] IN BLOOD BY AUTOMATED COUNT: 14 % (ref 11.5–14.5)
HCT VFR BLD AUTO: 35.1 % (ref 36–46)
HGB BLD-MCNC: 11.4 G/DL (ref 12–16)
MCH RBC QN AUTO: 30.7 PG (ref 26–34)
MCHC RBC AUTO-ENTMCNC: 32.5 G/DL (ref 32–36)
MCV RBC AUTO: 95 FL (ref 80–100)
NRBC BLD-RTO: 0 /100 WBCS (ref 0–0)
PLATELET # BLD AUTO: 153 X10*3/UL (ref 150–450)
RBC # BLD AUTO: 3.71 X10*6/UL (ref 4–5.2)
RH FACTOR (ANTIGEN D): NORMAL
TREPONEMA PALLIDUM IGG+IGM AB [PRESENCE] IN SERUM OR PLASMA BY IMMUNOASSAY: NONREACTIVE
WBC # BLD AUTO: 5.5 X10*3/UL (ref 4.4–11.3)

## 2024-02-15 PROCEDURE — 36415 COLL VENOUS BLD VENIPUNCTURE: CPT

## 2024-02-15 PROCEDURE — 86901 BLOOD TYPING SEROLOGIC RH(D): CPT

## 2024-02-15 PROCEDURE — 86780 TREPONEMA PALLIDUM: CPT

## 2024-02-15 PROCEDURE — 99213 OFFICE O/P EST LOW 20 MIN: CPT | Performed by: OBSTETRICS & GYNECOLOGY

## 2024-02-15 PROCEDURE — 86920 COMPATIBILITY TEST SPIN: CPT

## 2024-02-15 PROCEDURE — 86850 RBC ANTIBODY SCREEN: CPT

## 2024-02-15 PROCEDURE — 85027 COMPLETE CBC AUTOMATED: CPT

## 2024-02-15 PROCEDURE — 86900 BLOOD TYPING SEROLOGIC ABO: CPT

## 2024-02-15 NOTE — PROGRESS NOTES
Subjective   Patient ID [id number]   Ellen Crawley is a 33 y.o.  at 36w3d with a working estimated date of delivery of 3/11/2024, by Last Menstrual Period who presents for a routine prenatal visit. She denies vaginal bleeding, leakage of fluid, decreased fetal movements, or contractions.     Her pregnancy is complicated by:  Placenta previa    Objective   Physical Exam:   Weight: 68 kg (150 lb)  Expected Total Weight Gain: 11.5 kg (25 lb)-16 kg (35 lb)   Pregravid BMI: 20.14  BP: 108/70  Fetal Heart Rate: 122 Fundal Height (cm): 31 cm             Prenatal Labs  Urine Dip:  Lab Results   Component Value Date    KETONESU NEGATIVE 2024    GLUCOSEUR NEGATIVE 2024    LEUKOCYTESUR (3+) Moderate 2024     Lab Results   Component Value Date    HGB 11.1 (L) 2024    HCT 33.7 (L) 2024    ABO A 2024    HEPBSAG NONREACTIVE 2023     Assessment/Plan   Problem List Items Addressed This Visit             ICD-10-CM    Prenatal care, subsequent pregnancy in third trimester Z34.83    Urinary tract infection in mother during first trimester of pregnancy O23.41    Rubella non-immune status, antepartum O09.899, Z28.39    Placenta previa in third trimester O44.03    Relevant Orders    CBC    Type And Screen    Syphilis Screen with Reflex    Placenta previa affecting delivery O44.00    36 weeks gestation of pregnancy Z3A.36     Continue prenatal vitamin.  Labs reviewed.  GBS negative  Expected mode of delivery - scheduled   Schedule postpartum visits

## 2024-02-18 ENCOUNTER — HOSPITAL ENCOUNTER (INPATIENT)
Facility: HOSPITAL | Age: 34
LOS: 3 days | Discharge: HOME | End: 2024-02-21
Attending: OBSTETRICS & GYNECOLOGY | Admitting: OBSTETRICS & GYNECOLOGY
Payer: COMMERCIAL

## 2024-02-18 ENCOUNTER — ANESTHESIA EVENT (OUTPATIENT)
Dept: OBSTETRICS AND GYNECOLOGY | Facility: HOSPITAL | Age: 34
End: 2024-02-18
Payer: COMMERCIAL

## 2024-02-18 ENCOUNTER — ANESTHESIA (OUTPATIENT)
Dept: OBSTETRICS AND GYNECOLOGY | Facility: HOSPITAL | Age: 34
End: 2024-02-18
Payer: COMMERCIAL

## 2024-02-18 DIAGNOSIS — D62 ACUTE BLOOD LOSS ANEMIA: ICD-10-CM

## 2024-02-18 DIAGNOSIS — F41.9 ANXIETY: ICD-10-CM

## 2024-02-18 PROBLEM — Z87.59 HISTORY OF POSTPARTUM HEMORRHAGE: Status: ACTIVE | Noted: 2024-02-18

## 2024-02-18 PROBLEM — Z98.891 HISTORY OF CESAREAN DELIVERY: Status: ACTIVE | Noted: 2024-02-18

## 2024-02-18 PROBLEM — D64.9 ANEMIA: Status: ACTIVE | Noted: 2024-02-18

## 2024-02-18 LAB
BLOOD EXPIRATION DATE: NORMAL
BLOOD EXPIRATION DATE: NORMAL
DISPENSE STATUS: NORMAL
DISPENSE STATUS: NORMAL
ERYTHROCYTE [DISTWIDTH] IN BLOOD BY AUTOMATED COUNT: 13.6 % (ref 11.5–14.5)
HCT VFR BLD AUTO: 28.9 % (ref 36–46)
HGB BLD-MCNC: 9.8 G/DL (ref 12–16)
MCH RBC QN AUTO: 30.3 PG (ref 26–34)
MCHC RBC AUTO-ENTMCNC: 33.9 G/DL (ref 32–36)
MCV RBC AUTO: 90 FL (ref 80–100)
NRBC BLD-RTO: 0 /100 WBCS (ref 0–0)
PLATELET # BLD AUTO: 135 X10*3/UL (ref 150–450)
PRODUCT BLOOD TYPE: 6200
PRODUCT BLOOD TYPE: 6200
PRODUCT CODE: NORMAL
PRODUCT CODE: NORMAL
RBC # BLD AUTO: 3.23 X10*6/UL (ref 4–5.2)
UNIT ABO: NORMAL
UNIT ABO: NORMAL
UNIT NUMBER: NORMAL
UNIT NUMBER: NORMAL
UNIT RH: NORMAL
UNIT RH: NORMAL
UNIT VOLUME: 350
UNIT VOLUME: 350
WBC # BLD AUTO: 11 X10*3/UL (ref 4.4–11.3)
XM INTEP: NORMAL
XM INTEP: NORMAL

## 2024-02-18 PROCEDURE — 58300 INSERT INTRAUTERINE DEVICE: CPT | Performed by: STUDENT IN AN ORGANIZED HEALTH CARE EDUCATION/TRAINING PROGRAM

## 2024-02-18 PROCEDURE — 2500000005 HC RX 250 GENERAL PHARMACY W/O HCPCS: Performed by: STUDENT IN AN ORGANIZED HEALTH CARE EDUCATION/TRAINING PROGRAM

## 2024-02-18 PROCEDURE — 3700000018 HC OB ANESTHESIA C-SECTION: Performed by: OBSTETRICS & GYNECOLOGY

## 2024-02-18 PROCEDURE — 2720000007 HC OR 272 NO HCPCS: Performed by: OBSTETRICS & GYNECOLOGY

## 2024-02-18 PROCEDURE — 85027 COMPLETE CBC AUTOMATED: CPT | Performed by: STUDENT IN AN ORGANIZED HEALTH CARE EDUCATION/TRAINING PROGRAM

## 2024-02-18 PROCEDURE — 2500000004 HC RX 250 GENERAL PHARMACY W/ HCPCS (ALT 636 FOR OP/ED): Performed by: ANESTHESIOLOGY

## 2024-02-18 PROCEDURE — 88307 TISSUE EXAM BY PATHOLOGIST: CPT | Mod: TC,SUR | Performed by: STUDENT IN AN ORGANIZED HEALTH CARE EDUCATION/TRAINING PROGRAM

## 2024-02-18 PROCEDURE — 2500000004 HC RX 250 GENERAL PHARMACY W/ HCPCS (ALT 636 FOR OP/ED)

## 2024-02-18 PROCEDURE — 88307 TISSUE EXAM BY PATHOLOGIST: CPT | Performed by: STUDENT IN AN ORGANIZED HEALTH CARE EDUCATION/TRAINING PROGRAM

## 2024-02-18 PROCEDURE — 2500000001 HC RX 250 WO HCPCS SELF ADMINISTERED DRUGS (ALT 637 FOR MEDICARE OP)

## 2024-02-18 PROCEDURE — 7100000016 HC LABOR RECOVERY PER HOUR: Performed by: OBSTETRICS & GYNECOLOGY

## 2024-02-18 PROCEDURE — 36415 COLL VENOUS BLD VENIPUNCTURE: CPT | Performed by: STUDENT IN AN ORGANIZED HEALTH CARE EDUCATION/TRAINING PROGRAM

## 2024-02-18 PROCEDURE — 59514 CESAREAN DELIVERY ONLY: CPT | Performed by: OBSTETRICS & GYNECOLOGY

## 2024-02-18 PROCEDURE — 1100000001 HC PRIVATE ROOM DAILY

## 2024-02-18 PROCEDURE — 01961 ANES CESAREAN DELIVERY ONLY: CPT | Performed by: ANESTHESIOLOGY

## 2024-02-18 PROCEDURE — 2500000001 HC RX 250 WO HCPCS SELF ADMINISTERED DRUGS (ALT 637 FOR MEDICARE OP): Performed by: STUDENT IN AN ORGANIZED HEALTH CARE EDUCATION/TRAINING PROGRAM

## 2024-02-18 PROCEDURE — 51702 INSERT TEMP BLADDER CATH: CPT

## 2024-02-18 PROCEDURE — 2500000004 HC RX 250 GENERAL PHARMACY W/ HCPCS (ALT 636 FOR OP/ED): Performed by: STUDENT IN AN ORGANIZED HEALTH CARE EDUCATION/TRAINING PROGRAM

## 2024-02-18 PROCEDURE — 3700000014 HC AN EPIDURAL BLOCK CHARGE: Performed by: OBSTETRICS & GYNECOLOGY

## 2024-02-18 PROCEDURE — 59514 CESAREAN DELIVERY ONLY: CPT | Performed by: STUDENT IN AN ORGANIZED HEALTH CARE EDUCATION/TRAINING PROGRAM

## 2024-02-18 RX ORDER — LIDOCAINE HYDROCHLORIDE 10 MG/ML
30 INJECTION INFILTRATION; PERINEURAL ONCE AS NEEDED
Status: DISCONTINUED | OUTPATIENT
Start: 2024-02-18 | End: 2024-02-18

## 2024-02-18 RX ORDER — FENTANYL CITRATE 50 UG/ML
INJECTION, SOLUTION INTRAMUSCULAR; INTRAVENOUS AS NEEDED
Status: DISCONTINUED | OUTPATIENT
Start: 2024-02-18 | End: 2024-02-18

## 2024-02-18 RX ORDER — OXYTOCIN/0.9 % SODIUM CHLORIDE 30/500 ML
60 PLASTIC BAG, INJECTION (ML) INTRAVENOUS ONCE AS NEEDED
Status: DISCONTINUED | OUTPATIENT
Start: 2024-02-18 | End: 2024-02-18

## 2024-02-18 RX ORDER — CEFAZOLIN 1 G/1
INJECTION, POWDER, FOR SOLUTION INTRAVENOUS AS NEEDED
Status: DISCONTINUED | OUTPATIENT
Start: 2024-02-18 | End: 2024-02-18

## 2024-02-18 RX ORDER — BISACODYL 10 MG/1
10 SUPPOSITORY RECTAL DAILY PRN
Status: DISCONTINUED | OUTPATIENT
Start: 2024-02-18 | End: 2024-02-21 | Stop reason: HOSPADM

## 2024-02-18 RX ORDER — MISOPROSTOL 200 UG/1
800 TABLET ORAL ONCE AS NEEDED
Status: DISCONTINUED | OUTPATIENT
Start: 2024-02-18 | End: 2024-02-18

## 2024-02-18 RX ORDER — OXYTOCIN 10 [USP'U]/ML
10 INJECTION, SOLUTION INTRAMUSCULAR; INTRAVENOUS ONCE AS NEEDED
Status: DISCONTINUED | OUTPATIENT
Start: 2024-02-18 | End: 2024-02-20

## 2024-02-18 RX ORDER — LABETALOL HYDROCHLORIDE 5 MG/ML
20 INJECTION, SOLUTION INTRAVENOUS ONCE AS NEEDED
Status: DISCONTINUED | OUTPATIENT
Start: 2024-02-18 | End: 2024-02-21 | Stop reason: HOSPADM

## 2024-02-18 RX ORDER — KETOROLAC TROMETHAMINE 30 MG/ML
30 INJECTION, SOLUTION INTRAMUSCULAR; INTRAVENOUS EVERY 6 HOURS
Status: COMPLETED | OUTPATIENT
Start: 2024-02-18 | End: 2024-02-19

## 2024-02-18 RX ORDER — DIPHENHYDRAMINE HYDROCHLORIDE 50 MG/ML
INJECTION INTRAMUSCULAR; INTRAVENOUS AS NEEDED
Status: DISCONTINUED | OUTPATIENT
Start: 2024-02-18 | End: 2024-02-18

## 2024-02-18 RX ORDER — SIMETHICONE 80 MG
80 TABLET,CHEWABLE ORAL 4 TIMES DAILY PRN
Status: DISCONTINUED | OUTPATIENT
Start: 2024-02-18 | End: 2024-02-21 | Stop reason: HOSPADM

## 2024-02-18 RX ORDER — ONDANSETRON 4 MG/1
4 TABLET, FILM COATED ORAL EVERY 6 HOURS PRN
Status: DISCONTINUED | OUTPATIENT
Start: 2024-02-18 | End: 2024-02-18

## 2024-02-18 RX ORDER — OXYTOCIN 10 [USP'U]/ML
10 INJECTION, SOLUTION INTRAMUSCULAR; INTRAVENOUS ONCE AS NEEDED
Status: DISCONTINUED | OUTPATIENT
Start: 2024-02-18 | End: 2024-02-18

## 2024-02-18 RX ORDER — ONDANSETRON HYDROCHLORIDE 2 MG/ML
4 INJECTION, SOLUTION INTRAVENOUS EVERY 6 HOURS PRN
Status: DISCONTINUED | OUTPATIENT
Start: 2024-02-18 | End: 2024-02-21 | Stop reason: HOSPADM

## 2024-02-18 RX ORDER — HYDROXYZINE HYDROCHLORIDE 25 MG/1
25 TABLET, FILM COATED ORAL ONCE
Status: DISCONTINUED | OUTPATIENT
Start: 2024-02-18 | End: 2024-02-18

## 2024-02-18 RX ORDER — ENOXAPARIN SODIUM 100 MG/ML
40 INJECTION SUBCUTANEOUS EVERY 24 HOURS
Status: DISCONTINUED | OUTPATIENT
Start: 2024-02-18 | End: 2024-02-21 | Stop reason: HOSPADM

## 2024-02-18 RX ORDER — NALOXONE HYDROCHLORIDE 0.4 MG/ML
0.1 INJECTION, SOLUTION INTRAMUSCULAR; INTRAVENOUS; SUBCUTANEOUS EVERY 5 MIN PRN
Status: DISCONTINUED | OUTPATIENT
Start: 2024-02-18 | End: 2024-02-21 | Stop reason: HOSPADM

## 2024-02-18 RX ORDER — DIPHENHYDRAMINE HYDROCHLORIDE 50 MG/ML
25 INJECTION INTRAMUSCULAR; INTRAVENOUS EVERY 4 HOURS PRN
Status: DISCONTINUED | OUTPATIENT
Start: 2024-02-18 | End: 2024-02-21 | Stop reason: HOSPADM

## 2024-02-18 RX ORDER — FAMOTIDINE 10 MG/ML
INJECTION INTRAVENOUS AS NEEDED
Status: DISCONTINUED | OUTPATIENT
Start: 2024-02-18 | End: 2024-02-18

## 2024-02-18 RX ORDER — HYDRALAZINE HYDROCHLORIDE 20 MG/ML
5 INJECTION INTRAMUSCULAR; INTRAVENOUS ONCE AS NEEDED
Status: DISCONTINUED | OUTPATIENT
Start: 2024-02-18 | End: 2024-02-21 | Stop reason: HOSPADM

## 2024-02-18 RX ORDER — TRANEXAMIC ACID 100 MG/ML
1000 INJECTION, SOLUTION INTRAVENOUS ONCE AS NEEDED
Status: DISCONTINUED | OUTPATIENT
Start: 2024-02-18 | End: 2024-02-18

## 2024-02-18 RX ORDER — HYDRALAZINE HYDROCHLORIDE 20 MG/ML
5 INJECTION INTRAMUSCULAR; INTRAVENOUS ONCE AS NEEDED
Status: DISCONTINUED | OUTPATIENT
Start: 2024-02-18 | End: 2024-02-18

## 2024-02-18 RX ORDER — OXYCODONE HYDROCHLORIDE 5 MG/1
5 TABLET ORAL EVERY 4 HOURS PRN
Status: DISCONTINUED | OUTPATIENT
Start: 2024-02-19 | End: 2024-02-21 | Stop reason: HOSPADM

## 2024-02-18 RX ORDER — KETOROLAC TROMETHAMINE 30 MG/ML
INJECTION, SOLUTION INTRAMUSCULAR; INTRAVENOUS AS NEEDED
Status: DISCONTINUED | OUTPATIENT
Start: 2024-02-18 | End: 2024-02-18

## 2024-02-18 RX ORDER — METOCLOPRAMIDE HYDROCHLORIDE 5 MG/ML
10 INJECTION INTRAMUSCULAR; INTRAVENOUS EVERY 6 HOURS PRN
Status: DISCONTINUED | OUTPATIENT
Start: 2024-02-18 | End: 2024-02-18

## 2024-02-18 RX ORDER — MORPHINE SULFATE 0.5 MG/ML
INJECTION, SOLUTION EPIDURAL; INTRATHECAL; INTRAVENOUS AS NEEDED
Status: DISCONTINUED | OUTPATIENT
Start: 2024-02-18 | End: 2024-02-18

## 2024-02-18 RX ORDER — ONDANSETRON 4 MG/1
4 TABLET, FILM COATED ORAL EVERY 6 HOURS PRN
Status: DISCONTINUED | OUTPATIENT
Start: 2024-02-18 | End: 2024-02-21 | Stop reason: HOSPADM

## 2024-02-18 RX ORDER — LOPERAMIDE HYDROCHLORIDE 2 MG/1
4 CAPSULE ORAL EVERY 2 HOUR PRN
Status: DISCONTINUED | OUTPATIENT
Start: 2024-02-18 | End: 2024-02-18

## 2024-02-18 RX ORDER — PHENYLEPHRINE 10 MG/250 ML(40 MCG/ML)IN 0.9 % SOD.CHLORIDE INTRAVENOUS
CONTINUOUS PRN
Status: DISCONTINUED | OUTPATIENT
Start: 2024-02-18 | End: 2024-02-18

## 2024-02-18 RX ORDER — CEFAZOLIN SODIUM 2 G/100ML
2 INJECTION, SOLUTION INTRAVENOUS ONCE
Status: DISCONTINUED | OUTPATIENT
Start: 2024-02-18 | End: 2024-02-18 | Stop reason: HOSPADM

## 2024-02-18 RX ORDER — ADHESIVE BANDAGE
10 BANDAGE TOPICAL
Status: DISCONTINUED | OUTPATIENT
Start: 2024-02-18 | End: 2024-02-21 | Stop reason: HOSPADM

## 2024-02-18 RX ORDER — NIFEDIPINE 10 MG/1
10 CAPSULE ORAL ONCE AS NEEDED
Status: DISCONTINUED | OUTPATIENT
Start: 2024-02-18 | End: 2024-02-21 | Stop reason: HOSPADM

## 2024-02-18 RX ORDER — METHYLERGONOVINE MALEATE 0.2 MG/ML
0.2 INJECTION INTRAVENOUS ONCE AS NEEDED
Status: DISCONTINUED | OUTPATIENT
Start: 2024-02-18 | End: 2024-02-21 | Stop reason: HOSPADM

## 2024-02-18 RX ORDER — HYDROMORPHONE HYDROCHLORIDE 1 MG/ML
0.4 INJECTION, SOLUTION INTRAMUSCULAR; INTRAVENOUS; SUBCUTANEOUS EVERY 5 MIN PRN
Status: DISCONTINUED | OUTPATIENT
Start: 2024-02-18 | End: 2024-02-18

## 2024-02-18 RX ORDER — METOCLOPRAMIDE 10 MG/1
10 TABLET ORAL EVERY 6 HOURS PRN
Status: DISCONTINUED | OUTPATIENT
Start: 2024-02-18 | End: 2024-02-18

## 2024-02-18 RX ORDER — BUTORPHANOL TARTRATE 1 MG/ML
1 INJECTION INTRAMUSCULAR; INTRAVENOUS
Status: DISCONTINUED | OUTPATIENT
Start: 2024-02-18 | End: 2024-02-21 | Stop reason: HOSPADM

## 2024-02-18 RX ORDER — NIFEDIPINE 10 MG/1
10 CAPSULE ORAL ONCE AS NEEDED
Status: DISCONTINUED | OUTPATIENT
Start: 2024-02-18 | End: 2024-02-18

## 2024-02-18 RX ORDER — POLYETHYLENE GLYCOL 3350 17 G/17G
17 POWDER, FOR SOLUTION ORAL 2 TIMES DAILY PRN
Status: DISCONTINUED | OUTPATIENT
Start: 2024-02-18 | End: 2024-02-20

## 2024-02-18 RX ORDER — SODIUM CHLORIDE, SODIUM LACTATE, POTASSIUM CHLORIDE, CALCIUM CHLORIDE 600; 310; 30; 20 MG/100ML; MG/100ML; MG/100ML; MG/100ML
125 INJECTION, SOLUTION INTRAVENOUS CONTINUOUS
Status: DISCONTINUED | OUTPATIENT
Start: 2024-02-18 | End: 2024-02-18

## 2024-02-18 RX ORDER — OXYTOCIN/0.9 % SODIUM CHLORIDE 30/500 ML
60 PLASTIC BAG, INJECTION (ML) INTRAVENOUS ONCE AS NEEDED
Status: DISCONTINUED | OUTPATIENT
Start: 2024-02-18 | End: 2024-02-20

## 2024-02-18 RX ORDER — TERBUTALINE SULFATE 1 MG/ML
0.25 INJECTION SUBCUTANEOUS ONCE AS NEEDED
Status: DISCONTINUED | OUTPATIENT
Start: 2024-02-18 | End: 2024-02-18

## 2024-02-18 RX ORDER — PHENYLEPHRINE HCL IN 0.9% NACL 0.4MG/10ML
SYRINGE (ML) INTRAVENOUS AS NEEDED
Status: DISCONTINUED | OUTPATIENT
Start: 2024-02-18 | End: 2024-02-18

## 2024-02-18 RX ORDER — HYDROXYZINE HYDROCHLORIDE 25 MG/1
25 TABLET, FILM COATED ORAL EVERY 6 HOURS PRN
Status: DISCONTINUED | OUTPATIENT
Start: 2024-02-18 | End: 2024-02-21 | Stop reason: HOSPADM

## 2024-02-18 RX ORDER — OXYCODONE HYDROCHLORIDE 5 MG/1
10 TABLET ORAL EVERY 4 HOURS PRN
Status: DISCONTINUED | OUTPATIENT
Start: 2024-02-19 | End: 2024-02-21 | Stop reason: HOSPADM

## 2024-02-18 RX ORDER — CARBOPROST TROMETHAMINE 250 UG/ML
250 INJECTION, SOLUTION INTRAMUSCULAR ONCE AS NEEDED
Status: DISCONTINUED | OUTPATIENT
Start: 2024-02-18 | End: 2024-02-18

## 2024-02-18 RX ORDER — CARBOPROST TROMETHAMINE 250 UG/ML
250 INJECTION, SOLUTION INTRAMUSCULAR ONCE AS NEEDED
Status: DISCONTINUED | OUTPATIENT
Start: 2024-02-18 | End: 2024-02-21 | Stop reason: HOSPADM

## 2024-02-18 RX ORDER — LABETALOL HYDROCHLORIDE 5 MG/ML
20 INJECTION, SOLUTION INTRAVENOUS ONCE AS NEEDED
Status: DISCONTINUED | OUTPATIENT
Start: 2024-02-18 | End: 2024-02-18

## 2024-02-18 RX ORDER — LIDOCAINE 560 MG/1
1 PATCH PERCUTANEOUS; TOPICAL; TRANSDERMAL
Status: DISCONTINUED | OUTPATIENT
Start: 2024-02-18 | End: 2024-02-21 | Stop reason: HOSPADM

## 2024-02-18 RX ORDER — DIPHENHYDRAMINE HCL 25 MG
25 CAPSULE ORAL EVERY 4 HOURS PRN
Status: DISCONTINUED | OUTPATIENT
Start: 2024-02-18 | End: 2024-02-21 | Stop reason: HOSPADM

## 2024-02-18 RX ORDER — HYDROMORPHONE HYDROCHLORIDE 1 MG/ML
0.4 INJECTION, SOLUTION INTRAMUSCULAR; INTRAVENOUS; SUBCUTANEOUS ONCE
Status: COMPLETED | OUTPATIENT
Start: 2024-02-18 | End: 2024-02-18

## 2024-02-18 RX ORDER — METHYLERGONOVINE MALEATE 0.2 MG/ML
0.2 INJECTION INTRAVENOUS ONCE AS NEEDED
Status: DISCONTINUED | OUTPATIENT
Start: 2024-02-18 | End: 2024-02-18

## 2024-02-18 RX ORDER — IBUPROFEN 600 MG/1
600 TABLET ORAL EVERY 6 HOURS
Status: DISCONTINUED | OUTPATIENT
Start: 2024-02-19 | End: 2024-02-21 | Stop reason: HOSPADM

## 2024-02-18 RX ORDER — TRANEXAMIC ACID 100 MG/ML
1000 INJECTION, SOLUTION INTRAVENOUS ONCE AS NEEDED
Status: DISCONTINUED | OUTPATIENT
Start: 2024-02-18 | End: 2024-02-21 | Stop reason: HOSPADM

## 2024-02-18 RX ORDER — ACETAMINOPHEN 120 MG/1
SUPPOSITORY RECTAL AS NEEDED
Status: DISCONTINUED | OUTPATIENT
Start: 2024-02-18 | End: 2024-02-18

## 2024-02-18 RX ORDER — MIDAZOLAM HYDROCHLORIDE 1 MG/ML
INJECTION INTRAMUSCULAR; INTRAVENOUS AS NEEDED
Status: DISCONTINUED | OUTPATIENT
Start: 2024-02-18 | End: 2024-02-18

## 2024-02-18 RX ORDER — HYDROMORPHONE HYDROCHLORIDE 1 MG/ML
0.2 INJECTION, SOLUTION INTRAMUSCULAR; INTRAVENOUS; SUBCUTANEOUS EVERY 5 MIN PRN
Status: DISCONTINUED | OUTPATIENT
Start: 2024-02-18 | End: 2024-02-21 | Stop reason: HOSPADM

## 2024-02-18 RX ORDER — LOPERAMIDE HYDROCHLORIDE 2 MG/1
4 CAPSULE ORAL EVERY 2 HOUR PRN
Status: DISCONTINUED | OUTPATIENT
Start: 2024-02-18 | End: 2024-02-21 | Stop reason: HOSPADM

## 2024-02-18 RX ORDER — HYDROMORPHONE HYDROCHLORIDE 1 MG/ML
0.2 INJECTION, SOLUTION INTRAMUSCULAR; INTRAVENOUS; SUBCUTANEOUS EVERY 5 MIN PRN
Status: DISCONTINUED | OUTPATIENT
Start: 2024-02-18 | End: 2024-02-18

## 2024-02-18 RX ORDER — ONDANSETRON HYDROCHLORIDE 2 MG/ML
4 INJECTION, SOLUTION INTRAVENOUS EVERY 6 HOURS PRN
Status: DISCONTINUED | OUTPATIENT
Start: 2024-02-18 | End: 2024-02-18

## 2024-02-18 RX ORDER — LIDOCAINE HYDROCHLORIDE AND EPINEPHRINE 15; 5 MG/ML; UG/ML
INJECTION, SOLUTION EPIDURAL AS NEEDED
Status: DISCONTINUED | OUTPATIENT
Start: 2024-02-18 | End: 2024-02-18

## 2024-02-18 RX ORDER — MISOPROSTOL 200 UG/1
800 TABLET ORAL ONCE AS NEEDED
Status: DISCONTINUED | OUTPATIENT
Start: 2024-02-18 | End: 2024-02-21 | Stop reason: HOSPADM

## 2024-02-18 RX ORDER — ACETAMINOPHEN 325 MG/1
975 TABLET ORAL EVERY 6 HOURS
Status: DISCONTINUED | OUTPATIENT
Start: 2024-02-18 | End: 2024-02-21 | Stop reason: HOSPADM

## 2024-02-18 RX ORDER — BUPIVACAINE HYDROCHLORIDE 7.5 MG/ML
INJECTION, SOLUTION INTRASPINAL AS NEEDED
Status: DISCONTINUED | OUTPATIENT
Start: 2024-02-18 | End: 2024-02-18

## 2024-02-18 RX ADMIN — KETOROLAC TROMETHAMINE 30 MG: 30 INJECTION INTRAMUSCULAR; INTRAVENOUS at 16:48

## 2024-02-18 RX ADMIN — KETOROLAC TROMETHAMINE 30 MG: 30 INJECTION, SOLUTION INTRAMUSCULAR at 10:38

## 2024-02-18 RX ADMIN — MORPHINE SULFATE 3 MG: 0.5 INJECTION EPIDURAL; INTRATHECAL; INTRAVENOUS at 10:27

## 2024-02-18 RX ADMIN — DEXMEDETOMIDINE HYDROCHLORIDE 4 MCG: 100 INJECTION, SOLUTION INTRAVENOUS at 09:34

## 2024-02-18 RX ADMIN — HYDROMORPHONE HYDROCHLORIDE 0.4 MG: 1 INJECTION, SOLUTION INTRAMUSCULAR; INTRAVENOUS; SUBCUTANEOUS at 13:30

## 2024-02-18 RX ADMIN — Medication 0.73 MCG/KG/MIN: at 09:17

## 2024-02-18 RX ADMIN — ONDANSETRON 4 MG: 2 INJECTION INTRAMUSCULAR; INTRAVENOUS at 08:46

## 2024-02-18 RX ADMIN — Medication 80 MCG: at 11:10

## 2024-02-18 RX ADMIN — SODIUM CHLORIDE, POTASSIUM CHLORIDE, SODIUM LACTATE AND CALCIUM CHLORIDE 1000 ML: 600; 310; 30; 20 INJECTION, SOLUTION INTRAVENOUS at 18:34

## 2024-02-18 RX ADMIN — ACETAMINOPHEN 975 MG: 325 TABLET ORAL at 16:48

## 2024-02-18 RX ADMIN — CEFAZOLIN 2 G: 330 INJECTION, POWDER, FOR SOLUTION INTRAMUSCULAR; INTRAVENOUS at 09:26

## 2024-02-18 RX ADMIN — ACETAMINOPHEN 650 MG: 650 SUPPOSITORY RECTAL at 10:57

## 2024-02-18 RX ADMIN — BUPIVACAINE HYDROCHLORIDE IN DEXTROSE 1.8 ML: 7.5 INJECTION, SOLUTION SUBARACHNOID at 09:17

## 2024-02-18 RX ADMIN — DEXMEDETOMIDINE HYDROCHLORIDE 4 MCG: 100 INJECTION, SOLUTION INTRAVENOUS at 10:20

## 2024-02-18 RX ADMIN — DIPHENHYDRAMINE HYDROCHLORIDE 25 MG: 50 INJECTION, SOLUTION INTRAMUSCULAR; INTRAVENOUS at 10:23

## 2024-02-18 RX ADMIN — LEVONORGESTREL 52 MG: 52 INTRAUTERINE DEVICE INTRAUTERINE at 10:09

## 2024-02-18 RX ADMIN — HYDROXYZINE HYDROCHLORIDE 25 MG: 25 TABLET, FILM COATED ORAL at 21:24

## 2024-02-18 RX ADMIN — DIPHENHYDRAMINE HYDROCHLORIDE 25 MG: 50 INJECTION INTRAMUSCULAR; INTRAVENOUS at 15:09

## 2024-02-18 RX ADMIN — LIDOCAINE HYDROCHLORIDE AND EPINEPHRINE 3 ML: 15; 5 INJECTION, SOLUTION EPIDURAL at 09:42

## 2024-02-18 RX ADMIN — SODIUM CHLORIDE, SODIUM LACTATE, POTASSIUM CHLORIDE, AND CALCIUM CHLORIDE: 600; 310; 30; 20 INJECTION, SOLUTION INTRAVENOUS at 09:11

## 2024-02-18 RX ADMIN — ENOXAPARIN SODIUM 40 MG: 100 INJECTION SUBCUTANEOUS at 22:52

## 2024-02-18 RX ADMIN — OXYTOCIN 600 MILLI-UNITS/MIN: 10 INJECTION, SOLUTION INTRAMUSCULAR; INTRAVENOUS at 10:01

## 2024-02-18 RX ADMIN — FENTANYL CITRATE 100 MCG: 50 INJECTION, SOLUTION INTRAMUSCULAR; INTRAVENOUS at 09:41

## 2024-02-18 RX ADMIN — FAMOTIDINE 20 MG: 10 INJECTION, SOLUTION INTRAVENOUS at 08:46

## 2024-02-18 RX ADMIN — ONDANSETRON 4 MG: 2 INJECTION INTRAMUSCULAR; INTRAVENOUS at 10:19

## 2024-02-18 RX ADMIN — KETOROLAC TROMETHAMINE 30 MG: 30 INJECTION INTRAMUSCULAR; INTRAVENOUS at 22:52

## 2024-02-18 RX ADMIN — MIDAZOLAM HYDROCHLORIDE 2 MG: 1 INJECTION, SOLUTION INTRAMUSCULAR; INTRAVENOUS at 10:04

## 2024-02-18 RX ADMIN — DEXMEDETOMIDINE HYDROCHLORIDE 4 MCG: 100 INJECTION, SOLUTION INTRAVENOUS at 09:30

## 2024-02-18 RX ADMIN — ACETAMINOPHEN 975 MG: 325 TABLET ORAL at 22:52

## 2024-02-18 SDOH — HEALTH STABILITY: MENTAL HEALTH: CURRENT SMOKER: 0

## 2024-02-18 ASSESSMENT — PAIN SCALES - GENERAL
PAINLEVEL_OUTOF10: 7
PAINLEVEL_OUTOF10: 7
PAINLEVEL_OUTOF10: 4
PAINLEVEL_OUTOF10: 0 - NO PAIN
PAINLEVEL_OUTOF10: 7
PAINLEVEL_OUTOF10: 6
PAINLEVEL_OUTOF10: 6
PAINLEVEL_OUTOF10: 0 - NO PAIN
PAIN_LEVEL: 0
PAINLEVEL_OUTOF10: 3
PAINLEVEL_OUTOF10: 7
PAINLEVEL_OUTOF10: 3

## 2024-02-18 ASSESSMENT — PAIN DESCRIPTION - DESCRIPTORS
DESCRIPTORS: ACHING
DESCRIPTORS: ACHING;SORE
DESCRIPTORS: ACHING

## 2024-02-18 NOTE — H&P
Obstetrical Admission History and Physical     Ellen Crawley is a 33 y.o.  at 36w6d. OSCAR: 3/11/2024, by Last Menstrual Period. Estimated fetal weight: 2269g (US ). She has had prenatal care with Dr. Brisa Nichols .    Chief Complaint: Scheduled     Assessment/Plan    Placenta previa  - Admitted, consented   - Patient counseled on risks of  section including, bleeding, infection, injury to bowel, bladder, pelvic vasculature, pelvic nerves, fallopian tubes, and ovaries; discussed 3% chance of placenta accreta spectrum disorder in setting of placenta previa and consented for possible hysterectomy   - Starting Hgb 11.4, T&C for 2U pRBC   - Routine postpartum care after delivery   - ppBC: ppIUD     Maternal wellbeing   - Rubella non-immune, for vaccination postpartum     Fetal wellbeing  - GBS neg, no indication for ppx given  delivery   - Cat I, CEFM    Seen and discussed with Dr. Magdalena Rodríguez MD      Principal Problem:    Placenta previa in third trimester  Active Problems:    Rubella non-immune status, antepartum    Labor and delivery, indication for care    History of postpartum hemorrhage      Pregnancy Problems (from 24 to present)       Problem Noted Resolved    Labor and delivery, indication for care 2024 by Pattie Campos MD No    Priority:  Medium      Overview Addendum 2024  7:43 AM by Margarita Birmingham MD     Admitted and consented for pCS in setting of placental previa  PNLs reviewed, wnl except as listed  GBS neg  ppBC:           History of postpartum hemorrhage 2024 by Margarita Birmingham MD No    Priority:  Medium      Overview Signed 2024  7:44 AM by Margarita Birmingham MD     Hgb 11.4  T&C 1 unit pRBCs         Decreased fetal movements in third trimester 2024 by CHEL Dorado-CNP No    Priority:  Medium      36 weeks gestation of pregnancy 2024 by CHEL Dorado-CNP No    Priority:  Medium      Placenta previa  affecting delivery 2024 by Geraldine Dumont MD No    Priority:  Medium      Placenta previa in third trimester 10/12/2023 by Brisa Nichols MD No    Priority:  Medium      Overview Addendum 2024 10:16 AM by Brisa Nichols MD     Monitor with Ultrasounds  USN  Marginal previa.   Hospitalized with bleeding -24         Fetal abnormality affecting management of mother 10/12/2023 by Brisa Nichols MD No    Priority:  Medium      Overview Addendum 2023  9:55 AM by Brisa Nichols MD     Did testing with outside lab which showed male gender. Ultrasound shows female gender. Will send for PreQuel testing.   Did see genetics for counseling on 10/11/23  PreQuel shows normal XX          Prenatal care, subsequent pregnancy in third trimester 10/11/2023 by Brisa Nichols MD No    Priority:  Medium      Overview Addendum 2023 10:25 AM by Brisa Nichols MD     Declined genetics  Hx PP hemorrhage.          Urinary tract infection in mother during first trimester of pregnancy 10/11/2023 by Brisa Nichols MD No    Priority:  Medium      Overview Signed 10/11/2023  7:25 AM by Brisa Nichols MD     Treated with Macrobid         Rubella non-immune status, antepartum 10/11/2023 by Brisa Nichols MD No    Priority:  Medium      Overview Signed 10/11/2023  7:26 AM by Brisa Nichols MD     Rubella equivocal. Needs MMR postpartum.               Options for delivery have been discussed with the patient and she elects for a primary  section.    The surgery has been discussed in detail. The risks, benefits, complications, alternatives, expected outcomes, potential problems during recuperation and recovery, and the risks of not performing the procedure were discussed with the patient. The patient stated understanding that the risks of a  section include, but are not limited to: death; reaction to medications; injury to bowel,  bladder, ureters, uterus, cervix, vagina, and other pelvic and abdominal structures, infection; blood loss and possible need for transfusion; and potential need for more surgery, including hysterectomy. The risks of injury to the infant during  section were also discussed. The possible need for a  section in the future was explained. All questions were answered. There was concurrence with the planned procedure, and the patient wanted to proceed.    Admit to inpatient status. I anticipate that this patient will require a stay exceeding at least 2 midnights for delivery and postpartum.  Proceed with planned primary  section.  Management of pregnancy complications, as indicated.     Subjective   Good fetal movement. Denies vaginal bleeding. Denies contractions., Denies leaking of fluid.       Indication for Primary  Section  placenta previa     Obstetrical History   OB History    Para Term  AB Living   4 2 2   1 2   SAB IAB Ectopic Multiple Live Births   1       1      # Outcome Date GA Lbr Tony/2nd Weight Sex Delivery Anes PTL Lv   4 Current            3 Term 17 36w0d  3.402 kg M Vag-Spont None N    2 Term 14 36w0d  2.778 kg M Vag-Spont None N IVAN   1 SAB                Past Medical History  Past Medical History:   Diagnosis Date    Frequent urinary tract infections 02/15/2024    Comment on above: 10 WEEKS PREGNANT - UTI / NV    Personal history of other diseases of the respiratory system 2021    History of acute bacterial sinusitis    Unspecified amblyopia, left eye     Amblyopia of left eye    Urinary tract infection         Past Surgical History   Past Surgical History:   Procedure Laterality Date    DILATION AND CURETTAGE OF UTERUS N/A 10/12/2022    OTHER SURGICAL HISTORY  02/10/2021    Abscess incision and drainage     Social History  Social History     Tobacco Use    Smoking status: Never     Passive exposure: Never    Smokeless tobacco: Never    Substance Use Topics    Alcohol use: Not Currently     Substance and Sexual Activity   Drug Use Never     Allergies  Clindamycin     Medications  Medications Prior to Admission   Medication Sig Dispense Refill Last Dose    compress.stocking,knee,reg,med (Jobst Anti-Embolism Stocking) misc 1 PAIR OF JOBST Anti-embolism stockings  medium 1 each 0     lansoprazole (Prevacid SoluTab) 30 mg disintegrating tablet Take 1 tablet (30 mg) by mouth once daily. Dissolve on tongue before swallowing particles; do not chew, cut, break, or swallow whole. 30 tablet 2     metoclopramide (Reglan) 10 mg tablet Take 1 tablet (10 mg) by mouth 3 times a day for 10 days. 30 tablet 0     prenatal vitamin, iron-folic, (prenatal vit no.130-iron-folic) 27 mg iron-800 mcg folic acid tablet Take 1 tablet by mouth once daily.        Objective    Last Vitals  Temp Pulse Resp BP MAP O2 Sat                   Physical Examination  GENERAL: well developed, well nourished female in no acute distress  HEENT: clear sclera  NECK: supple  LUNGS: breathing comfortably on room air  HEART: warm and well-perfused  SKIN: no rashes or lesions  NEUROLOGICAL: grossly intact bilaterally  PSYCHOLOGICAL: appropriate affect

## 2024-02-18 NOTE — ANESTHESIA PREPROCEDURE EVALUATION
Patient: Ellen Crawley    Evaluation Method: In-person visit    Procedure Information       Date/Time: 24 0900    Procedure: Esther  Section @ 36.6 wks - placenta previa    Location: [facility]    Surgeons: Brisa Nichols MD        33 y.o.  at 36w6d      Relevant Problems   Anesthesia (within normal limits)      Cardiovascular   (+) Hyperlipemia      Endocrine (within normal limits)      GI (within normal limits)      /Renal   (+) Urinary tract infection in mother during first trimester of pregnancy      Neuro/Psych   (+) Anxiety      Pulmonary (within normal limits)      GI/Hepatic (within normal limits)      Hematology   (+) Anemia      Musculoskeletal (within normal limits)      Infectious Disease   (+) Urinary tract infection in mother during first trimester of pregnancy     Past Surgical History:   Procedure Laterality Date    DILATION AND CURETTAGE OF UTERUS N/A 10/12/2022    OTHER SURGICAL HISTORY  02/10/2021    Abscess incision and drainage     No anesthesia complications.  Pt is NPO after midnight.  The patient has had previous epidural anesthesia (x1) without complications.      Clinical information reviewed:                   NPO Detail:  No data recorded     OB/Gyn Evaluation    Present Pregnancy    Patient is pregnant now.  (+) , placental abnormality - previa   Obstetric History    : 2 prior . prior epidural no complications.            There were no vitals filed for this visit.    Past Surgical History:   Procedure Laterality Date    DILATION AND CURETTAGE OF UTERUS N/A 10/12/2022    OTHER SURGICAL HISTORY  02/10/2021    Abscess incision and drainage     Past Medical History:   Diagnosis Date    Frequent urinary tract infections 02/15/2024    Comment on above: 10 WEEKS PREGNANT - UTI / NV    Personal history of other diseases of the respiratory system 2021    History of acute bacterial sinusitis    Unspecified amblyopia, left eye     Amblyopia of  left eye    Urinary tract infection        Current Facility-Administered Medications:     carboprost (Hemabate) injection 250 mcg, 250 mcg, intramuscular, Once PRN, Pattie Campos MD    hydrALAZINE (Apresoline) injection 5 mg, 5 mg, intravenous, Once PRN, Pattie Campos MD    labetaloL (Normodyne,Trandate) injection 20 mg, 20 mg, intravenous, Once PRN, Pattie Campos MD    lactated Ringer's bolus 500 mL, 500 mL, intravenous, Once PRN, Pattie Campos MD    lactated Ringer's bolus 500 mL, 500 mL, intravenous, Once PRN, Patite Campos MD    lactated Ringer's infusion, 125 mL/hr, intravenous, Continuous, Pattie Campos MD    levonorgestreL (LILETTA) IUD 52 mg, 1 each, intrauterine, Once PRN, Pattie Campos MD    lidocaine (Xylocaine) 10 mg/mL (1 %) injection 300 mg, 30 mL, subcutaneous, Once PRN, Pattie Campos MD    loperamide (Imodium) capsule 4 mg, 4 mg, oral, q2h PRN, Pattie Campos MD    methylergonovine (Methergine) injection 0.2 mg, 0.2 mg, intramuscular, Once PRN, Pattie Campos MD    metoclopramide (Reglan) tablet 10 mg, 10 mg, oral, q6h PRN **OR** metoclopramide (Reglan) injection 10 mg, 10 mg, intravenous, q6h PRN, Pattie Campos MD    miSOPROStoL (Cytotec) tablet 800 mcg, 800 mcg, rectal, Once PRN, Pattie Campos MD    NIFEdipine (Procardia) capsule 10 mg, 10 mg, oral, Once PRN, Pattie Campos MD    ondansetron (Zofran) tablet 4 mg, 4 mg, oral, q6h PRN **OR** ondansetron (Zofran) injection 4 mg, 4 mg, intravenous, q6h PRN, Pattie Campos MD    oxytocin (Pitocin) bolus from bag, 600 everardo-units/min, intravenous, Once PRN, Pattie Campos MD    oxytocin (Pitocin) bolus from bag, 600 everardo-units/min, intravenous, Once PRN, Pattie Campos MD    oxytocin (Pitocin) infusion in sodium chloride 0.9% 30 units/500 mL, 60 everardo-units/min, intravenous, Once PRN, Pattie Campos MD    oxytocin (Pitocin) injection 10 Units, 10 Units, intramuscular, Once PRNPattie MD    oxytocin (Pitocin)  injection 10 Units, 10 Units, intramuscular, Once PRN, Pattie Campos MD    terbutaline (Brethine) injection 0.25 mg, 0.25 mg, subcutaneous, Once PRN, Pattie Campos MD    tranexamic acid (Cyklokapron) injection 1,000 mg, 1,000 mg, intravenous, Once PRN, Pattie Campos MD  Prior to Admission medications    Medication Sig Start Date End Date Taking? Authorizing Provider   compress.stocking,knee,reg,med (Jobst Anti-Embolism Stocking) misc 1 PAIR OF JOBST Anti-embolism stockings  medium 11/30/23   Brisa Nichols MD   lansoprazole (Prevacid SoluTab) 30 mg disintegrating tablet Take 1 tablet (30 mg) by mouth once daily. Dissolve on tongue before swallowing particles; do not chew, cut, break, or swallow whole. 1/22/24 4/21/24  Brisa Nichols MD   metoclopramide (Reglan) 10 mg tablet Take 1 tablet (10 mg) by mouth 3 times a day for 10 days. 1/25/24 2/4/24  Brisa Nichols MD   prenatal vitamin, iron-folic, (prenatal vit no.130-iron-folic) 27 mg iron-800 mcg folic acid tablet Take 1 tablet by mouth once daily. 1/4/24   Historical Provider, MD     Allergies   Allergen Reactions    Clindamycin Hives and Rash     Social History     Tobacco Use    Smoking status: Never     Passive exposure: Never    Smokeless tobacco: Never   Substance Use Topics    Alcohol use: Not Currently         Chemistry    Lab Results   Component Value Date/Time     01/16/2024 1657    K 3.9 01/16/2024 1657     01/16/2024 1657    CO2 20 (L) 01/16/2024 1657    BUN 5 (L) 01/16/2024 1657    CREATININE 0.39 (L) 01/16/2024 1657    Lab Results   Component Value Date/Time    CALCIUM 8.7 01/16/2024 1657    ALKPHOS 105 01/16/2024 1657    AST 13 01/16/2024 1657    ALT 8 01/16/2024 1657    BILITOT 0.3 01/16/2024 1657          Lab Results   Component Value Date/Time    WBC 5.5 02/15/2024 1015    HGB 11.4 (L) 02/15/2024 1015    HCT 35.1 (L) 02/15/2024 1015     02/15/2024 1015     Lab Results   Component Value Date/Time     PROTIME 10.9 01/16/2024 1657    INR 1.0 01/16/2024 1657     No results found for this or any previous visit (from the past 4464 hour(s)).     Physical Exam    Airway  Mallampati: I  TM distance: >3 FB  Neck ROM: full     Cardiovascular - normal exam     Dental - normal exam     Pulmonary - normal exam     Abdominal            Anesthesia Plan    History of general anesthesia?: yes  History of complications of general anesthesia?: no    ASA 2     CSE     The patient is not a current smoker.  Patient did not smoke on day of procedure.    Postoperative administration of opioids is intended.  Anesthetic plan and risks discussed with patient.  Use of blood products discussed with patient who consented to blood products.    Plan discussed with attending and resident.

## 2024-02-18 NOTE — L&D DELIVERY NOTE
OB Delivery Note  2024  Ellen Crawley  33 y.o.   , Low Transverse      Dictation:   Pre op diagnosis: 32yo  at 36.6wga presenting for scheduled pLTCS for posterior placenta previa   Post op diagnosis: Same    Findings: Normal appearing gravid uterus, fallopian tubes, and ovaries. Amniotic fluid clear, female infant in cephalic presentation.     Procedure: Patient was taken to the OR where combined spinal epidural anesthesia was administered. She was then placed in the dorsal supine position with a left lateral tilt. A shepherd catheter was placed, SCDs were applied, and a vaginal prep was performed. A pre-procedure time out was performed.  The patient was given prophylactic dose of IV antibiotics. She was then prepped and draped in the usual sterile fashion.     A Pfannenstiel skin incision was made through the skin with the scalpel and then carried through the subcutaneous fat to the underlying fascial layer with sharp dissection. Small bleeders were electrocauterized with the Bovie. The fascia was incised on either side of the midline with the scalpel, and fascia was then dissected off the rectus muscle bilaterally using sharp dissection with curved Silvestre scissors with clear visualization inferiorly. The superior aspect of the incision was grasped, tented up with Kocher clamps and the rectus muscle was dissected off bluntly. The muscles were divided in the midline, the peritoneum was identified and then entered bluntly, and incision extended superiorly and inferiorly with visualization of the bladder below. The bladder blade was inserted.     A low transverse uterine incision was made with the scalpel, the uterine cavity was entered, and the hysterotomy was extended bilaterally with cephalocaudal stretching. After bringing up the fetal head to the hysterotomy, the fetus failed to deliver with fundal pressure. Subsequently, a vacuum-assisted delivery was attempted and one pop off occurred, after  which decision was made to dissect the left rectus muscle with the Bovie to expand space available for delivery. Subsequently, the fetus was delivered with fundal pressure and without assistance of vacuum. The cord was clamped and cut, and infant was handed off to the awaiting nursing staff.     A cord blood sample was collected. The placenta was then expressed and was noted to be intact. The uterus was exteriorized and cleared of all clot and debris. The uterine incision was repaired using a running stitch of 0-Vicryl. Prior to placing the last stitches of the first layer, a Mirena intrauterine device was inserted with a ring forcep and the string were guided into the cervix. A second layer of the same suture was placed in an imbricating fashion. Two figures of eight were placed on either corner with 0-Vicryl and small bleeders were electrocauterized with the Bovie. Hemostasis was noted.     The uterus was then placed back inside the pelvis, the gutters were cleared of all clots and debris. The hysterotomy was again evaluated and found to be hemostatic, and the underside of the fascia and bladder and the rectus muscles were also found to be hemostatic. The fascia was closed using a running stitch of 0-Vicryl. The subcutaneous layer was irrigated, small bleeding vessels were cauterized, and the subcutaneous layer was reapproximated using interrupted stitches of 2-0 Monocryl placed in a deep dermal fashion. The skin was closed with 4-0 Monocryl.  All counts were correct, the patient tolerated the procedure well. Dr. Nichols was present for all key portions of the procedure. The patient and infant were taken back to the recovery room in stable condition     Gestational Age: 36w6d  /Para:   Quantitative Blood Loss: Admission to Discharge: 1200 mL (2024  7:00 AM - 2024  4:33 PM)    Sallie Crawley [[id number]]      Labor Events    Sac identifier: Sac 1  Rupture date/time: 2024 0958  Rupture  type: Artificial  Fluid color: Clear  Fluid odor: None  Labor type:  Without Labor  Labor allowed to proceed with plans for an attempted vaginal birth?: No  Complications: Placenta Previa       Placenta    Placenta delivery date/time: 2024 1003  Placenta removal: Expressed  Placenta appearance: Intact  Placenta disposition: pathology       Cord    Vessels: 3 vessels  Complications: None  Delayed cord clamping?: Yes  Cord clamped date/time: 2024 1000  Cord blood disposition: Discarded, Lab  Gases sent?: No  Stem cell collection (by provider): No       Lacerations    Episiotomy: None  Perineal laceration: None  Other lacerations?: No  Repair suture: None       Anesthesia    Method: Spinal, Epidural       Operative Delivery    Forceps attempted?: No  Vacuum extractor attempted?: No       Shoulder Dystocia    Shoulder dystocia present?: No        Delivery    Time head delivered: 2024 10:00:00  Birth date/time: 2024 10:00:00  Delivery type: , Low Transverse   categorization: primary   priority: routine  Indications for : Other (Add Comments)  Incision type: low transverse  Complications: Placenta Previa       Resuscitation    Method: Tactile stimulation, Suctioning       Apgars    Living status: Living  Apgar Component Scores:  1 min.:  5 min.:  10 min.:  15 min.:  20 min.:    Skin color:  0  1       Heart rate:  2  2       Reflex irritability:  2  2       Muscle tone:  2  2       Respiratory effort:  2  2       Total:  8  9       Apgars assigned by: RABIA ZHOU       Delivery Providers    Delivering clinician: Brisa Nichols MD   Provider Role    Beatriz Segundo RN Delivery Nurse    Nhung Zhou RN Nursery Nurse    Gila Rodríguez MD Resident                 Gila Rodríguez MD

## 2024-02-18 NOTE — ANESTHESIA POSTPROCEDURE EVALUATION
Patient: Ellen Crawley    Procedure Summary       Date: 24 Room / Location: [facility]    Anesthesia Start: 911 Anesthesia Stop:     Procedure: Esther  Section @ 36.6 wks - placenta previa Diagnosis:       Placenta previa in third trimester      (Placenta previa in third trimester [O44.03])    Surgeons: Brisa Nichols MD Responsible Provider: Robbi Mccoy MD    Anesthesia Type: CSE ASA Status: 2            Anesthesia Type: CSE    Vitals Value Taken Time   /59 24 1114   Temp 36.1 24 1118   Pulse 72 24 1117   Resp 16 24 1118   SpO2 99 % 24 1117       Anesthesia Post Evaluation    Patient location during evaluation: PACU  Patient participation: complete - patient participated  Level of consciousness: awake and alert  Pain score: 0  Pain management: adequate  Multimodal analgesia pain management approach  Airway patency: patent  Two or more strategies used to mitigate risk of obstructive sleep apnea  Cardiovascular status: acceptable and hemodynamically stable  Respiratory status: acceptable and room air  Hydration status: acceptable  Postoperative Nausea and Vomiting: none  Comments: Neuraxial site assessed. No visible redness or swelling. Pain acceptably controlled. Patient able to ambulate and move all extremities without difficulty. Able to void. No complaints of nausea/vomiting. Tolerating PO intake well. No s/sx of PDPHA.      Ellen Crawley is a 33 y.o., , who had a , Low Transverse  delivery on 2024  at 36w6d and is now POD1.    She had Neuraxial Anesthesia without immediate complications noted.       Pain well controlled    Vitals:    24 0432   BP: 105/66   Pulse: 67   Resp: 16   Temp: 36.6 °C (97.9 °F)   SpO2: 96%       Neuraxial site assessed. No visible redness or swelling or drainage. Patient able to ambulate and move all extremities without difficulty. Able to void. No complaints of nausea/vomiting.  Tolerating PO intake well. No s/sx of PDPH.     Anesthesia will sign off     Danielle Hawkins, CAA      There were no known notable events for this encounter.

## 2024-02-18 NOTE — ANESTHESIA PROCEDURE NOTES
CSE Block    Patient location during procedure: OR  Start time: 2/18/2024 9:16 AM  End time: 2/18/2024 9:26 AM  Reason for block: labor analgesia}  Staffing  Performed: resident   Authorized by: Robbi Mccoy MD    Performed by: Jud Barney MD    Preanesthetic Checklist  Completed: patient identified, IV checked, site marked, risks and benefits discussed, surgical consent, monitors and equipment checked, pre-op evaluation, timeout performed and sterile techniques followed  Block Timeout  RN/Licensed healthcare professional reads aloud to the Anesthesia provider and entire team: Patient identity, procedure with side and site, patient position, and as applicable the availability of implants/special equipment/special requirements.  Patient on coagulant treatment: no  Timeout performed at: 2/18/2024 9:14 AM    CSE Block  Patient position: sitting  Prep: ChloraPrep  Sterility prep: gloves, hand, mask and cap  Sedation level: no sedation  Patient monitoring: blood pressure, continuous pulse oximetry and heart rate  Approach: midline  Local numbing: lidocaine 1% to skin and subcutaneous tissues  Vertebral space: lumbar  L3-4  Guidance: landmark technique    Epidural Needle  SUMAN technique: saline  Needle type: Tuohy   Needle gauge: 17 G  Needle length: 8.9 cm  Needle insertion depth: 5 cm  Spinal Needle  Needle type: pencil-point   Needle gauge: 25 G  Needle length: 12.7 cm    Epidural Catheter  Catheter type: stylet  Catheter size: 19 G  Catheter at skin depth: 10 cm  Catheter securement method: clear occlusive dressing, liquid medical adhesive and surgical tape    Test dose: lidocaine 1.5% with epinephrine 1-to-200,000  Test dose result: no positive test dose            Assessment  Sensory level: T6 bilateral  Block outcome: Allis test negative  Number of attempts: 1  Events: no positive test dose  Procedure assessment: patient tolerated procedure well with no immediate complications

## 2024-02-19 PROBLEM — N93.9 VAGINAL SPOTTING: Status: RESOLVED | Noted: 2024-01-04 | Resolved: 2024-02-19

## 2024-02-19 PROBLEM — O44.00 PLACENTA PREVIA AFFECTING DELIVERY (HHS-HCC): Status: RESOLVED | Noted: 2024-01-16 | Resolved: 2024-02-19

## 2024-02-19 PROBLEM — O35.9XX0 FETAL ABNORMALITY AFFECTING MANAGEMENT OF MOTHER (HHS-HCC): Status: RESOLVED | Noted: 2023-10-12 | Resolved: 2024-02-19

## 2024-02-19 PROBLEM — Z34.83 PRENATAL CARE, SUBSEQUENT PREGNANCY IN THIRD TRIMESTER (HHS-HCC): Status: RESOLVED | Noted: 2023-10-11 | Resolved: 2024-02-19

## 2024-02-19 PROBLEM — O36.8130 DECREASED FETAL MOVEMENTS IN THIRD TRIMESTER (HHS-HCC): Status: RESOLVED | Noted: 2024-02-04 | Resolved: 2024-02-19

## 2024-02-19 PROBLEM — Z87.59 HISTORY OF POSTPARTUM HEMORRHAGE: Status: RESOLVED | Noted: 2024-02-18 | Resolved: 2024-02-19

## 2024-02-19 PROBLEM — O44.03 PLACENTA PREVIA IN THIRD TRIMESTER (HHS-HCC): Status: RESOLVED | Noted: 2023-10-12 | Resolved: 2024-02-19

## 2024-02-19 PROBLEM — O23.41 URINARY TRACT INFECTION IN MOTHER DURING FIRST TRIMESTER OF PREGNANCY (HHS-HCC): Status: RESOLVED | Noted: 2023-10-11 | Resolved: 2024-02-19

## 2024-02-19 PROBLEM — Z3A.36 36 WEEKS GESTATION OF PREGNANCY (HHS-HCC): Status: RESOLVED | Noted: 2024-02-04 | Resolved: 2024-02-19

## 2024-02-19 LAB
ERYTHROCYTE [DISTWIDTH] IN BLOOD BY AUTOMATED COUNT: 14.2 % (ref 11.5–14.5)
HCT VFR BLD AUTO: 28 % (ref 36–46)
HGB BLD-MCNC: 9 G/DL (ref 12–16)
MCH RBC QN AUTO: 30.4 PG (ref 26–34)
MCHC RBC AUTO-ENTMCNC: 32.1 G/DL (ref 32–36)
MCV RBC AUTO: 95 FL (ref 80–100)
NRBC BLD-RTO: 0 /100 WBCS (ref 0–0)
PLATELET # BLD AUTO: 142 X10*3/UL (ref 150–450)
RBC # BLD AUTO: 2.96 X10*6/UL (ref 4–5.2)
WBC # BLD AUTO: 9.5 X10*3/UL (ref 4.4–11.3)

## 2024-02-19 PROCEDURE — 2500000001 HC RX 250 WO HCPCS SELF ADMINISTERED DRUGS (ALT 637 FOR MEDICARE OP): Performed by: STUDENT IN AN ORGANIZED HEALTH CARE EDUCATION/TRAINING PROGRAM

## 2024-02-19 PROCEDURE — 36415 COLL VENOUS BLD VENIPUNCTURE: CPT | Performed by: STUDENT IN AN ORGANIZED HEALTH CARE EDUCATION/TRAINING PROGRAM

## 2024-02-19 PROCEDURE — 2500000004 HC RX 250 GENERAL PHARMACY W/ HCPCS (ALT 636 FOR OP/ED): Performed by: STUDENT IN AN ORGANIZED HEALTH CARE EDUCATION/TRAINING PROGRAM

## 2024-02-19 PROCEDURE — 85027 COMPLETE CBC AUTOMATED: CPT | Performed by: STUDENT IN AN ORGANIZED HEALTH CARE EDUCATION/TRAINING PROGRAM

## 2024-02-19 PROCEDURE — 1100000001 HC PRIVATE ROOM DAILY

## 2024-02-19 PROCEDURE — 2500000001 HC RX 250 WO HCPCS SELF ADMINISTERED DRUGS (ALT 637 FOR MEDICARE OP): Performed by: NURSE PRACTITIONER

## 2024-02-19 RX ORDER — ALPRAZOLAM 0.5 MG/1
0.5 TABLET ORAL ONCE
Status: COMPLETED | OUTPATIENT
Start: 2024-02-19 | End: 2024-02-19

## 2024-02-19 RX ORDER — BUSPIRONE HYDROCHLORIDE 5 MG/1
5 TABLET ORAL 2 TIMES DAILY
Status: DISCONTINUED | OUTPATIENT
Start: 2024-02-19 | End: 2024-02-21 | Stop reason: HOSPADM

## 2024-02-19 RX ADMIN — IBUPROFEN 600 MG: 600 TABLET, FILM COATED ORAL at 17:49

## 2024-02-19 RX ADMIN — ACETAMINOPHEN 975 MG: 325 TABLET ORAL at 05:11

## 2024-02-19 RX ADMIN — POLYETHYLENE GLYCOL 3350 17 G: 17 POWDER, FOR SOLUTION ORAL at 08:52

## 2024-02-19 RX ADMIN — ALPRAZOLAM 0.5 MG: 0.5 TABLET ORAL at 10:43

## 2024-02-19 RX ADMIN — SIMETHICONE 80 MG: 80 TABLET, CHEWABLE ORAL at 08:52

## 2024-02-19 RX ADMIN — KETOROLAC TROMETHAMINE 30 MG: 30 INJECTION INTRAMUSCULAR; INTRAVENOUS at 05:11

## 2024-02-19 RX ADMIN — BUSPIRONE HYDROCHLORIDE 5 MG: 5 TABLET ORAL at 22:30

## 2024-02-19 RX ADMIN — ACETAMINOPHEN 975 MG: 325 TABLET ORAL at 10:39

## 2024-02-19 RX ADMIN — SIMETHICONE 80 MG: 80 TABLET, CHEWABLE ORAL at 20:16

## 2024-02-19 RX ADMIN — ACETAMINOPHEN 975 MG: 325 TABLET ORAL at 17:49

## 2024-02-19 RX ADMIN — IBUPROFEN 600 MG: 600 TABLET, FILM COATED ORAL at 10:39

## 2024-02-19 ASSESSMENT — PAIN SCALES - GENERAL: PAINLEVEL_OUTOF10: 6

## 2024-02-19 NOTE — PROGRESS NOTES
Postpartum Progress Note    Assessment/Plan     Ellen Carwley is a 33 y.o., , who initially presented for scheduled CS ISO posterior placenta previa.    She had a , Low Transverse  delivery on 2024  at 36w6d and is now POD1.    #Post-op urinary retention  - s/p straight cath x1 w/ continued inability to void despite urgency  - shepherd reinserted, maintain in place till AM POD2  - 0.5mg xanax x1 now for severe anxiety    #Post-op , Low Transverse   - continue routine postoperative care  - pain well controlled on ERAS protocol  - Hg  11.4  --> EBL 1200 --> 9.8; acute blood loss anemia, asymptomatic, PO iron supplement on DC   - DVT Score: 6 SCDs and enoxaparin prophylactic dose daily while inpatient  - The patient's blood type is A POS. Rhogam is not indicated.    #Contraception  - s/p ppIUD    #Maternal Well-Being  - emotional support provided  - FOB/partner @ bedside and supportive/engaged  - restart buspar per pt request (stopped in pregnancy)  -  feeding: breastfeeding/pumping encouraged; lactation consult prn     #Dispo  - anticipate d/c on POD #3 if meeting all post-op and postpartum milestones  - for f/u 2wks with Primary OB provider and prescriber of buspar    Active Problems:    Anxiety    Rubella non-immune status, antepartum     delivery delivered    History of postpartum hemorrhage    Anemia      Subjective   Her pain is poorly controlled with current medications- cannot void spontaneously  She is not passing flatus  She is ambulating independently; curled over.  She is tolerating a Adult diet Regular  She is not voiding spontaneously;  She reports  being extremely anxious. Wanting to restart buspar- was stopped for pregnancy.     Pt seen sitting on toilet in bathroom. Very tearful, trouble taking breaths 2/2 crying. Strong urge to void but unable- she is very uncomfortable but also very anxious about the pain from catheter insertion.     Objective   Last  Vitals:  Temp Pulse Resp BP MAP Pulse Ox   36.2 °C (97.2 °F) 69 16 104/68   100 %     Vitals Min/Max Last 24 Hours:  Temp  Min: 36.2 °C (97.2 °F)  Max: 37.1 °C (98.8 °F)  Pulse  Min: 54  Max: 83  Resp  Min: 16  Max: 20  BP  Min: 75/41  Max: 130/71    Intake/Output:     Intake/Output Summary (Last 24 hours) at 2/19/2024 1029  Last data filed at 2/18/2024 2300  Gross per 24 hour   Intake 2000 ml   Output 2050 ml   Net -50 ml       Physical Exam:  General: moderate distress, well nourished, postpartum  Obstetric: fundus firm below umbilicus, lochia light  Skin: Warm, dry; no rashes/lesions/erythema; Pfannenstiel incision: clean, dry, well approximated, open to air, negative discharge/warmth/erythema   Breast: No masses, nipple discharge  Neuro: A/Ox3, no gross motor deficit   GI: no distension, appropriately tender, soft, +BS  : shepherd draining clear yellow urine, scant blood specks  Respiratory: Even and unlabored on RA, LSCTA BL  Cardiovascular: No BLE edema; No erythema, warmth  Psych: anxious, tearful    Lab Data:  Lab Results   Component Value Date    WBC 11.0 02/18/2024    HGB 9.8 (L) 02/18/2024    HCT 28.9 (L) 02/18/2024     (L) 02/18/2024

## 2024-02-20 PROCEDURE — 2500000001 HC RX 250 WO HCPCS SELF ADMINISTERED DRUGS (ALT 637 FOR MEDICARE OP)

## 2024-02-20 PROCEDURE — 2500000005 HC RX 250 GENERAL PHARMACY W/O HCPCS: Performed by: STUDENT IN AN ORGANIZED HEALTH CARE EDUCATION/TRAINING PROGRAM

## 2024-02-20 PROCEDURE — 2500000004 HC RX 250 GENERAL PHARMACY W/ HCPCS (ALT 636 FOR OP/ED): Performed by: STUDENT IN AN ORGANIZED HEALTH CARE EDUCATION/TRAINING PROGRAM

## 2024-02-20 PROCEDURE — 2500000001 HC RX 250 WO HCPCS SELF ADMINISTERED DRUGS (ALT 637 FOR MEDICARE OP): Performed by: NURSE PRACTITIONER

## 2024-02-20 PROCEDURE — 1100000001 HC PRIVATE ROOM DAILY

## 2024-02-20 PROCEDURE — 2500000001 HC RX 250 WO HCPCS SELF ADMINISTERED DRUGS (ALT 637 FOR MEDICARE OP): Performed by: STUDENT IN AN ORGANIZED HEALTH CARE EDUCATION/TRAINING PROGRAM

## 2024-02-20 PROCEDURE — 2500000004 HC RX 250 GENERAL PHARMACY W/ HCPCS (ALT 636 FOR OP/ED): Performed by: NURSE PRACTITIONER

## 2024-02-20 RX ORDER — POLYETHYLENE GLYCOL 3350 17 G/17G
17 POWDER, FOR SOLUTION ORAL EVERY 12 HOURS
Status: DISCONTINUED | OUTPATIENT
Start: 2024-02-20 | End: 2024-02-21 | Stop reason: HOSPADM

## 2024-02-20 RX ORDER — OXYCODONE HYDROCHLORIDE 5 MG/1
2.5 TABLET ORAL ONCE
Status: COMPLETED | OUTPATIENT
Start: 2024-02-20 | End: 2024-02-20

## 2024-02-20 RX ORDER — ALPRAZOLAM 0.25 MG/1
0.25 TABLET ORAL ONCE
Status: COMPLETED | OUTPATIENT
Start: 2024-02-20 | End: 2024-02-20

## 2024-02-20 RX ADMIN — BUSPIRONE HYDROCHLORIDE 5 MG: 5 TABLET ORAL at 21:36

## 2024-02-20 RX ADMIN — ALPRAZOLAM 0.25 MG: 0.25 TABLET ORAL at 10:53

## 2024-02-20 RX ADMIN — ACETAMINOPHEN 975 MG: 325 TABLET ORAL at 00:04

## 2024-02-20 RX ADMIN — SIMETHICONE 80 MG: 80 TABLET, CHEWABLE ORAL at 17:59

## 2024-02-20 RX ADMIN — ACETAMINOPHEN 975 MG: 325 TABLET ORAL at 17:58

## 2024-02-20 RX ADMIN — POLYETHYLENE GLYCOL 3350 17 G: 17 POWDER, FOR SOLUTION ORAL at 17:59

## 2024-02-20 RX ADMIN — IBUPROFEN 600 MG: 600 TABLET, FILM COATED ORAL at 11:42

## 2024-02-20 RX ADMIN — IBUPROFEN 600 MG: 600 TABLET, FILM COATED ORAL at 06:04

## 2024-02-20 RX ADMIN — IBUPROFEN 600 MG: 600 TABLET, FILM COATED ORAL at 17:58

## 2024-02-20 RX ADMIN — POLYETHYLENE GLYCOL 3350 17 G: 17 POWDER, FOR SOLUTION ORAL at 06:45

## 2024-02-20 RX ADMIN — ENOXAPARIN SODIUM 40 MG: 100 INJECTION SUBCUTANEOUS at 00:04

## 2024-02-20 RX ADMIN — SIMETHICONE 80 MG: 80 TABLET, CHEWABLE ORAL at 11:43

## 2024-02-20 RX ADMIN — OXYCODONE 2.5 MG: 5 TABLET ORAL at 19:56

## 2024-02-20 RX ADMIN — LIDOCAINE 1 PATCH: 4 PATCH TOPICAL at 11:43

## 2024-02-20 RX ADMIN — IBUPROFEN 600 MG: 600 TABLET, FILM COATED ORAL at 00:04

## 2024-02-20 RX ADMIN — ACETAMINOPHEN 975 MG: 325 TABLET ORAL at 11:42

## 2024-02-20 RX ADMIN — ACETAMINOPHEN 975 MG: 325 TABLET ORAL at 06:04

## 2024-02-20 RX ADMIN — BUSPIRONE HYDROCHLORIDE 5 MG: 5 TABLET ORAL at 09:10

## 2024-02-20 ASSESSMENT — PAIN SCALES - GENERAL
PAINLEVEL_OUTOF10: 7
PAINLEVEL_OUTOF10: 6
PAINLEVEL_OUTOF10: 8
PAINLEVEL_OUTOF10: 7
PAINLEVEL_OUTOF10: 8

## 2024-02-20 ASSESSMENT — PAIN - FUNCTIONAL ASSESSMENT: PAIN_FUNCTIONAL_ASSESSMENT: 0-10

## 2024-02-20 ASSESSMENT — PAIN DESCRIPTION - LOCATION: LOCATION: ABDOMEN

## 2024-02-20 NOTE — PROGRESS NOTES
"Postpartum Progress Note    Assessment/Plan     Ellen Crawley is a 33 y.o., , who presented for scheduled CS ISO posterior placenta previa.    She had a , Low Transverse  delivery on 2024  at 36w6d and is now POD2.    #Post-op urinary retention --> resolved  - shepherd reinserted POD1 -> removed this AM and now voiding spontaneously  - 0.25mg xanax x1 for shepherd removal; tolerated well    #Post-op , Low Transverse   - continue routine postoperative care  - pain well controlled on ERAS protocol  - Hg  11.4  --> EBL 1200 --> 9.8; acute blood loss anemia, asymptomatic, PO iron supplement on DC   - DVT Score: 6 SCDs and enoxaparin prophylactic dose daily while inpatient  - The patient's blood type is A POS. Rhogam is not indicated.    #Contraception  - s/p ppIUD    #Maternal Well-Being  - emotional support provided  - pt's mom and mother in law @ bedside and supportive/engaged  - cont buspar per pt request (stopped in pregnancy)  -  feeding: breastfeeding/pumping encouraged; lactation consult prn     #Dispo  - anticipate d/c on POD #3 if meeting all post-op and postpartum milestones  - for f/u 2wks with Primary OB provider and prescriber of buspar    Active Problems:    Anxiety    Rubella non-immune status, antepartum     delivery delivered    Anemia      Subjective   Her pain is moderately controlled with current medications-hasn't used oxy, c/f constipation  She is passing flatus  She is ambulating independently; curled over.  She is tolerating a Adult diet Regular  She is voiding spontaneously;  She reports  being so relieved about voiding. Was anxious about shepherd removal and voiding, remains baseline anxious.     Pt seen in room standing holding baby. States she has trouble sitting down and needs help getting into bed. Has not used oxy but \"it feels like I'm going to crawl out of my skin\" the pain is so bad about an hour prior to scheduled motrin/tylenol. Calls the pain " "\"brutal.\" Discussed risks/benefits of pain meds and supportive measures to encourage BM's.     Objective   Last Vitals:  Temp Pulse Resp BP MAP Pulse Ox   36 °C (96.8 °F) 88 18 120/80   96 %     Vitals Min/Max Last 24 Hours:  Temp  Min: 36 °C (96.8 °F)  Max: 37 °C (98.6 °F)  Pulse  Min: 76  Max: 88  Resp  Min: 18  Max: 18  BP  Min: 110/72  Max: 120/80    Intake/Output:     Intake/Output Summary (Last 24 hours) at 2/20/2024 1352  Last data filed at 2/20/2024 0700  Gross per 24 hour   Intake --   Output 3125 ml   Net -3125 ml         Physical Exam:  General: moderate distress, well nourished, postpartum  Obstetric: fundus firm below umbilicus, lochia light  Skin: Warm, dry; no rashes/lesions/erythema; Pfannenstiel incision: clean, dry, well approximated, open to air, negative discharge/warmth/erythema   Breast: No masses, nipple discharge  Neuro: A/Ox3, no gross motor deficit   GI: no distension, appropriately tender, soft, +BS  : shepherd draining clear yellow urine, scant blood specks  Respiratory: Even and unlabored on RA, LSCTA BL  Cardiovascular: No BLE edema; No erythema, warmth  Psych: anxious, tearful    Lab Data:  Lab Results   Component Value Date    WBC 9.5 02/19/2024    HGB 9.0 (L) 02/19/2024    HCT 28.0 (L) 02/19/2024     (L) 02/19/2024      "

## 2024-02-21 VITALS
DIASTOLIC BLOOD PRESSURE: 71 MMHG | RESPIRATION RATE: 16 BRPM | OXYGEN SATURATION: 96 % | SYSTOLIC BLOOD PRESSURE: 119 MMHG | HEART RATE: 74 BPM | TEMPERATURE: 96.8 F

## 2024-02-21 LAB
LABORATORY COMMENT REPORT: NORMAL
PATH REPORT.FINAL DX SPEC: NORMAL
PATH REPORT.GROSS SPEC: NORMAL
PATH REPORT.RELEVANT HX SPEC: NORMAL
PATH REPORT.TOTAL CANCER: NORMAL
RESIDENT REVIEW: NORMAL

## 2024-02-21 PROCEDURE — 2500000001 HC RX 250 WO HCPCS SELF ADMINISTERED DRUGS (ALT 637 FOR MEDICARE OP): Performed by: NURSE PRACTITIONER

## 2024-02-21 PROCEDURE — 2500000004 HC RX 250 GENERAL PHARMACY W/ HCPCS (ALT 636 FOR OP/ED): Performed by: NURSE PRACTITIONER

## 2024-02-21 PROCEDURE — 2500000004 HC RX 250 GENERAL PHARMACY W/ HCPCS (ALT 636 FOR OP/ED): Performed by: STUDENT IN AN ORGANIZED HEALTH CARE EDUCATION/TRAINING PROGRAM

## 2024-02-21 PROCEDURE — 2500000001 HC RX 250 WO HCPCS SELF ADMINISTERED DRUGS (ALT 637 FOR MEDICARE OP): Performed by: STUDENT IN AN ORGANIZED HEALTH CARE EDUCATION/TRAINING PROGRAM

## 2024-02-21 RX ORDER — BUSPIRONE HYDROCHLORIDE 5 MG/1
5 TABLET ORAL 2 TIMES DAILY
Qty: 60 TABLET | Refills: 1 | Status: SHIPPED | OUTPATIENT
Start: 2024-02-21

## 2024-02-21 RX ORDER — FERROUS SULFATE 325(65) MG
65 TABLET ORAL DAILY
Qty: 30 TABLET | Refills: 1 | Status: SHIPPED | OUTPATIENT
Start: 2024-02-21 | End: 2024-04-21

## 2024-02-21 RX ORDER — IBUPROFEN 600 MG/1
600 TABLET ORAL EVERY 6 HOURS PRN
Qty: 120 TABLET | Refills: 0 | Status: SHIPPED | OUTPATIENT
Start: 2024-02-21 | End: 2024-03-22

## 2024-02-21 RX ORDER — OXYCODONE HYDROCHLORIDE 5 MG/1
5 TABLET ORAL EVERY 6 HOURS PRN
Qty: 20 TABLET | Refills: 0 | Status: SHIPPED | OUTPATIENT
Start: 2024-02-21 | End: 2024-02-26 | Stop reason: SDUPTHER

## 2024-02-21 RX ORDER — ACETAMINOPHEN 500 MG
1000 TABLET ORAL EVERY 6 HOURS PRN
Qty: 120 TABLET | Refills: 0 | Status: SHIPPED | OUTPATIENT
Start: 2024-02-21

## 2024-02-21 RX ORDER — POLYETHYLENE GLYCOL 3350 17 G/17G
17 POWDER, FOR SOLUTION ORAL 2 TIMES DAILY PRN
Qty: 850 G | Refills: 2 | Status: SHIPPED | OUTPATIENT
Start: 2024-02-21 | End: 2024-03-22

## 2024-02-21 RX ADMIN — IBUPROFEN 600 MG: 600 TABLET, FILM COATED ORAL at 12:29

## 2024-02-21 RX ADMIN — IBUPROFEN 600 MG: 600 TABLET, FILM COATED ORAL at 00:05

## 2024-02-21 RX ADMIN — SIMETHICONE 80 MG: 80 TABLET, CHEWABLE ORAL at 00:53

## 2024-02-21 RX ADMIN — ACETAMINOPHEN 975 MG: 325 TABLET ORAL at 12:30

## 2024-02-21 RX ADMIN — POLYETHYLENE GLYCOL 3350 17 G: 17 POWDER, FOR SOLUTION ORAL at 06:33

## 2024-02-21 RX ADMIN — OXYCODONE HYDROCHLORIDE 10 MG: 5 TABLET ORAL at 06:57

## 2024-02-21 RX ADMIN — IBUPROFEN 600 MG: 600 TABLET, FILM COATED ORAL at 06:33

## 2024-02-21 RX ADMIN — ACETAMINOPHEN 975 MG: 325 TABLET ORAL at 06:33

## 2024-02-21 RX ADMIN — BUSPIRONE HYDROCHLORIDE 5 MG: 5 TABLET ORAL at 09:47

## 2024-02-21 RX ADMIN — OXYCODONE HYDROCHLORIDE 5 MG: 5 TABLET ORAL at 00:05

## 2024-02-21 RX ADMIN — SIMETHICONE 80 MG: 80 TABLET, CHEWABLE ORAL at 12:35

## 2024-02-21 RX ADMIN — OXYCODONE HYDROCHLORIDE 5 MG: 5 TABLET ORAL at 12:30

## 2024-02-21 RX ADMIN — ACETAMINOPHEN 975 MG: 325 TABLET ORAL at 00:05

## 2024-02-21 RX ADMIN — ENOXAPARIN SODIUM 40 MG: 100 INJECTION SUBCUTANEOUS at 00:06

## 2024-02-21 ASSESSMENT — PAIN SCALES - GENERAL
PAINLEVEL_OUTOF10: 10 - WORST POSSIBLE PAIN
PAINLEVEL_OUTOF10: 10 - WORST POSSIBLE PAIN
PAINLEVEL_OUTOF10: 7
PAINLEVEL_OUTOF10: 9

## 2024-02-21 ASSESSMENT — PAIN - FUNCTIONAL ASSESSMENT
PAIN_FUNCTIONAL_ASSESSMENT: 0-10

## 2024-02-21 ASSESSMENT — PAIN DESCRIPTION - LOCATION
LOCATION: ABDOMEN
LOCATION: INCISION
LOCATION: INCISION

## 2024-02-21 NOTE — PROGRESS NOTES
Social Work Note    Patient: Ellen Crawley, 32yo,     SW met with Ms Crawley for assessment due to history of anxiety. She was accepting and engaged. She spoke openly about anxiety. She has restarted medication, is connected to counseling, and has excellent support including her mother (who is staying with her) and her . She was insightful and aware of symptoms and risk factors with a plan in place. SW offered support and encouragement and reviewed additional resources available. No additional concerns noted. Ms Crawley and  clear from KELSEY/     MADALYN Kelly

## 2024-02-21 NOTE — CARE PLAN
Pt stable for discharge, VSS, pain under control, discharge instructions completed, all questions answered.

## 2024-02-23 NOTE — DISCHARGE SUMMARY
Discharge Summary    Admission Date: 2024  Discharge Date: 24  Discharge Diagnosis: Placenta previa in third trimester     Patient Active Problem List   Diagnosis    ADHD (attention deficit hyperactivity disorder)    Anxiety    Hyperlipemia    Rubella non-immune status, antepartum    Constipation     delivery delivered    Anemia       Hospital Course  Ellen Crawley is a 33 y.o.,     Initially presented for: CS    Admission Date: 2024    Delivery Date: 2024  10:00 AM     Delivery type: , Low Transverse      GA at delivery: 36w6d    Outcome: Living     Anesthesia during delivery: Spinal;Epidural     Intrapartum complications: Placenta Previa     Feeding method: Breastfeeding Status: No    Contraception: Post-placental IUD    Rhogam: The patient's blood type is A POS.  Rhogam is not indicated.     Now postpartum day: 5.    Hospital course n/f:  Resolved post op urinary retention.  Hg 11.4 --> EBL 1200 --> 9.8; acute blood loss anemia, asymptomatic, PO iron supplement   PP course otherwise uneventful.  Meeting all postpartum milestones- ambulating independently, passing flatus, tolerating PO intake, lochia light, voiding spontaneously, and pain well controlled with PO meds.     Dispo  OK for DC today  2 week incision check and 6wk postpartum follow-up.     Pertinent Physical Exam At Time of Discharge  General: well appearing, well nourished, postpartum  Obstetric: fundus firm below umbilicus, lochia light  Skin: Warm, dry; no rashes/lesions/erythema; Pfannenstiel incision: clean, dry, well approximated, open to air, negative discharge/warmth/erythema   Breast: No masses, nipple discharge  Neuro: A/Ox3, conversational, no gross motor deficit   GI: no distension, appropriately tender, soft, +BS  Respiratory: Even and unlabored on RA, LSCTA BL  Cardiovascular: No BLE edema; No erythema, warmth  Psych: appropriate mood and affect       Your medication list        START taking these  medications        Instructions Last Dose Given Next Dose Due   acetaminophen 500 mg tablet  Commonly known as: Tylenol      Take 2 tablets (1,000 mg) by mouth every 6 hours if needed for moderate pain (4 - 6).       busPIRone 5 mg tablet  Commonly known as: Buspar      Take 1 tablet (5 mg) by mouth 2 times a day.       ferrous sulfate (325 mg ferrous sulfate) tablet      Take 1 tablet by mouth once daily. With vitamin C to limit GI upset.       ibuprofen 600 mg tablet      Take 1 tablet (600 mg) by mouth every 6 hours if needed for moderate pain (4 - 6) (pain).       oxyCODONE 5 mg immediate release tablet  Commonly known as: Roxicodone      Take 1 tablet (5 mg) by mouth every 6 hours if needed for severe pain (7 - 10).       polyethylene glycol 17 gram/dose powder  Commonly known as: Glycolax, Miralax      Take 17 g by mouth 2 times a day as needed (constipation).              CONTINUE taking these medications        Instructions Last Dose Given Next Dose Due   Jobst Anti-Embolism Stocking misc  Generic drug: compress.stocking,knee,reg,med      1 PAIR OF JOBST Anti-embolism stockings  medium       lansoprazole 30 mg disintegrating tablet  Commonly known as: Prevacid SoluTab      Take 1 tablet (30 mg) by mouth once daily. Dissolve on tongue before swallowing particles; do not chew, cut, break, or swallow whole.       prenatal vitamin (iron-folic) 27 mg iron-800 mcg folic acid tablet                  STOP taking these medications      metoclopramide 10 mg tablet  Commonly known as: Reglan                  Where to Get Your Medications        These medications were sent to [facility]/pharmacy #[id number] - [facility] [address] [address] [address]      Phone: [phone]   acetaminophen 500 mg tablet  busPIRone 5 mg tablet  ferrous sulfate (325 mg ferrous sulfate) tablet  ibuprofen 600 mg tablet  oxyCODONE 5 mg immediate release tablet  polyethylene glycol 17 gram/dose powder            Outpatient Follow-Up  Future Appointments   Date Time Provider Department Center   3/6/2024 10:15 AM MD Lilian [id number]   3/28/2024 10:15 AM MD Lilian Baltazar300[facility]       Paris Liz, APRN-CNP

## 2024-02-25 DIAGNOSIS — K59.00 CONSTIPATION, UNSPECIFIED CONSTIPATION TYPE: Primary | ICD-10-CM

## 2024-02-25 RX ORDER — DOCUSATE SODIUM 100 MG/1
100 CAPSULE, LIQUID FILLED ORAL 2 TIMES DAILY PRN
Qty: 30 CAPSULE | Refills: 5 | Status: SHIPPED | OUTPATIENT
Start: 2024-02-25

## 2024-02-25 NOTE — PROGRESS NOTES
Patient called answering service with on ongoing abdominal discomfort. No bleeding. No fevers. Believes wound looks okay within limitations of describing over the phone. May be constipated as not passing stool regularly. Will send script for Basim, to call office Monday.

## 2024-02-26 RX ORDER — OXYCODONE HYDROCHLORIDE 5 MG/1
5 TABLET ORAL EVERY 6 HOURS PRN
Qty: 5 TABLET | Refills: 0 | Status: SHIPPED | OUTPATIENT
Start: 2024-02-26

## 2024-02-29 ENCOUNTER — APPOINTMENT (OUTPATIENT)
Dept: OBSTETRICS AND GYNECOLOGY | Facility: CLINIC | Age: 34
End: 2024-02-29
Payer: COMMERCIAL

## 2024-03-06 ENCOUNTER — OFFICE VISIT (OUTPATIENT)
Dept: OBSTETRICS AND GYNECOLOGY | Facility: CLINIC | Age: 34
End: 2024-03-06
Payer: COMMERCIAL

## 2024-03-06 VITALS
SYSTOLIC BLOOD PRESSURE: 110 MMHG | BODY MASS INDEX: 22.46 KG/M2 | WEIGHT: 134.8 LBS | HEIGHT: 65 IN | DIASTOLIC BLOOD PRESSURE: 66 MMHG

## 2024-03-06 NOTE — PROGRESS NOTES
Patient came in today for 2 week incision check.   Patient identified by name and .  States she delivered by  on .  Denies fever, chills, odor, swelling, or redness.  Able to pass gas, able to pass BM but experiencing some mild constipation which she is taking colace for. Able to urinate without any issues.   Endorses pain above her incision site mid abdomen below her umbilicus with palpation. Is taking tylenol and ibuprofen Q6 for pain management.   Denies any h/a, blurred or double vision, lightheadedness, dizziness, or RUQ pain.   States very minimal vaginal bleeding at this point.  Patient denies SI, HI or depression   Patient is currently formula feeding.  Incision site well intact, no drainage or odor noted at this time.  Discussed signs and symptoms to be aware of and patient informed of when to call office.   Consulted Dr. Barrera to assess her regarding her abdominal tenderness.   Patient aware to schedule 6 week PPV   Patient verbalized understanding, denies any further questions or concerns at this time.    Ekaterina Timmons, DUKEN RN

## 2024-03-06 NOTE — PROGRESS NOTES
Pt verified by name and .  Nurse in room pt has tenderness above incision, mid abdomen, below umbilicus and on sides of incision.  Dr. Barrera in room to evaluate pt.  Pt has tenderness on side of incision.  Per Dr. Barrera pt is to call nurse at 4 pm to give up.  Pt to return to office tomorrow to be evaluated.  Pt is aware if symptoms become worse to go to L&D to be evaluated.  Pt has no questions at this time

## 2024-03-07 ENCOUNTER — APPOINTMENT (OUTPATIENT)
Dept: OBSTETRICS AND GYNECOLOGY | Facility: CLINIC | Age: 34
End: 2024-03-07
Payer: COMMERCIAL

## 2024-03-08 ENCOUNTER — TELEPHONE (OUTPATIENT)
Dept: OBSTETRICS AND GYNECOLOGY | Facility: CLINIC | Age: 34
End: 2024-03-08
Payer: COMMERCIAL

## 2024-03-08 NOTE — TELEPHONE ENCOUNTER
Pt left voicemail for nurse stating she felt better.  Nurse returned call.  Left message for pt to return call.

## 2024-03-28 ENCOUNTER — POSTPARTUM VISIT (OUTPATIENT)
Dept: OBSTETRICS AND GYNECOLOGY | Facility: CLINIC | Age: 34
End: 2024-03-28
Payer: COMMERCIAL

## 2024-03-28 ENCOUNTER — DOCUMENTATION (OUTPATIENT)
Dept: OBSTETRICS AND GYNECOLOGY | Facility: CLINIC | Age: 34
End: 2024-03-28

## 2024-03-28 VITALS
SYSTOLIC BLOOD PRESSURE: 116 MMHG | HEIGHT: 64 IN | WEIGHT: 137 LBS | DIASTOLIC BLOOD PRESSURE: 78 MMHG | BODY MASS INDEX: 23.39 KG/M2

## 2024-03-28 DIAGNOSIS — Z30.431 CHECKING OF INTRAUTERINE DEVICE: ICD-10-CM

## 2024-03-28 ASSESSMENT — EDINBURGH POSTNATAL DEPRESSION SCALE (EPDS)
THE THOUGHT OF HARMING MYSELF HAS OCCURRED TO ME: NEVER
I HAVE BEEN ABLE TO LAUGH AND SEE THE FUNNY SIDE OF THINGS: AS MUCH AS I ALWAYS COULD
I HAVE LOOKED FORWARD WITH ENJOYMENT TO THINGS: AS MUCH AS I EVER DID
I HAVE BEEN SO UNHAPPY THAT I HAVE HAD DIFFICULTY SLEEPING: NOT AT ALL
I HAVE BLAMED MYSELF UNNECESSARILY WHEN THINGS WENT WRONG: NO, NEVER
I HAVE BEEN SO UNHAPPY THAT I HAVE BEEN CRYING: NO, NEVER
TOTAL SCORE: 4
I HAVE FELT SAD OR MISERABLE: NO, NOT AT ALL
I HAVE BEEN ANXIOUS OR WORRIED FOR NO GOOD REASON: YES, SOMETIMES
I HAVE FELT SCARED OR PANICKY FOR NO GOOD REASON: NO, NOT MUCH
THINGS HAVE BEEN GETTING ON TOP OF ME: NO, MOST OF THE TIME I HAVE COPED QUITE WELL

## 2024-03-28 NOTE — PROGRESS NOTES
contacted pt  name and  verified  Spoke with pt made aware ultrasound in place for further imaging if pt wants to investigate placement due to strings not being visible    pt verbalized understanding  no further questions or concerns at this time

## 2024-03-28 NOTE — PROGRESS NOTES
Post Partum Visit  Subjective    Ellen Crawley is a 33 y.o.  presenting for postpartum follow-up:    Delivery Date: 2024   GA at Delivery: 36.6  Type of Delivery: , Low Transverse      Pregnancy was complicated by:  Pregnancy Problems (from 23 to present)       Problem Noted Resolved     delivery delivered 2024 by Pattie Campos MD No    Priority:  Medium      Overview Addendum 2024  7:43 AM by Margarita Birmingham MD     Admitted and consented for pCS in setting of placental previa  PNLs reviewed, wnl except as listed  GBS neg  ppBC:           Rubella non-immune status, antepartum 10/11/2023 by Brisa Nichols MD No    Priority:  Medium      Overview Signed 10/11/2023  7:26 AM by Brisa Nichols MD     Rubella equivocal. Needs MMR postpartum.         History of postpartum hemorrhage 2024 by Margarita Birmingham MD 2024 by CHEL Cali-CNP    Priority:  Medium      Overview Signed 2024  7:44 AM by Margarita Birmingham MD     Hgb 11.4  T&C 1 unit pRBCs         Decreased fetal movements in third trimester 2024 by Gauri Wood APRN-CNP 2024 by CHEL Cali-CNP    Priority:  Medium      36 weeks gestation of pregnancy 2024 by Gauri Wood APRN-CNP 2024 by CHEL Cali-CNP    Priority:  Medium      Placenta previa affecting delivery 2024 by Geraldine Dumont MD 2024 by CHEL Cali-CNP    Priority:  Medium      Placenta previa in third trimester 10/12/2023 by Brsia Nichols MD 2024 by CHEL Cali-CNP    Priority:  Medium      Overview Addendum 2024 10:16 AM by Brisa Nichols MD     Monitor with Ultrasounds  [facility]  Marginal previa.   Hospitalized with bleeding -24         Fetal abnormality affecting management of mother 10/12/2023 by Brisa Nichols MD 2024 by CHEL Cali-CNP    Priority:  Medium      Overview Addendum 2023  9:55 AM by  Brisa Nichols MD     Did testing with outside lab which showed male gender. Ultrasound shows female gender. Will send for PreQuel testing.   Did see genetics for counseling on 10/11/23  PreQuel shows normal XX          Prenatal care, subsequent pregnancy in third trimester 10/11/2023 by Brisa Nichols MD 2024 by CHEL Cali-CNP    Priority:  Medium      Overview Addendum 2023 10:25 AM by Brisa Nichols MD     Declined genetics  Hx PP hemorrhage.          Urinary tract infection in mother during first trimester of pregnancy 10/11/2023 by Brisa Nichols MD 2024 by CHEL Cali-CNP    Priority:  Medium      Overview Signed 10/11/2023  7:25 AM by Brisa Nichols MD     Treated with Macrobid               Concerns: Bleeding pattern, Had trouble with pain/movement after delivery.     Pain: none  Lacerations:   Laceration Repaired?   Perineal: None     Periurethral:       Labial:       Sulcus:       Vaginal:       Cervical:           Repair suture: None   Number of repair packets:     Blood loss (mL):     Total estimated blood loss (mL): 1200     Menses: No bleeding after delivery, but then yesterday had large clot pass.     Sexual Health Concerns: No  Contraceptive Method: IUD    Feeding Method: She is bottle feeding. no breast or nursing problems currently. Did have engorgement. Saw Dr Coronado.   Lactation Consult Needed?: No    Birth Trauma: No  Baby:   Information for the patient's :  Katie Crawley [[id number]]   Katie Crawley ,   Information for the patient's :  Katie Crawley [[id number]]   female   Bonding: doing well without issue    Mood:   EPDS 4        Objective   Vitals:    24 1016   BP: 116/78        Physical Exam  Constitutional:       Appearance: Normal appearance. She is obese.   Genitourinary:      Vulva, bladder and urethral meatus normal.      Right Labia: No skin changes or Bartholin's cyst.     Left Labia: No skin changes or  Bartholin's cyst.     No vaginal discharge.      No vaginal prolapse present.       Right Adnexa: not tender and no mass present.     Left Adnexa: not tender and no mass present.     Cervix is not parous.      Uterus is not enlarged or tender.      No urethral tenderness present.      Pelvic floor neuro is intact.  HENT:      Head: Normocephalic and atraumatic.      Nose: Nose normal.      Mouth/Throat:      Mouth: Mucous membranes are moist.   Eyes:      Conjunctiva/sclera: Conjunctivae normal.   Cardiovascular:      Rate and Rhythm: Normal rate and regular rhythm.   Pulmonary:      Effort: Pulmonary effort is normal.      Breath sounds: Normal breath sounds.   Abdominal:      General: Abdomen is flat. Bowel sounds are normal.      Palpations: Abdomen is soft.   Musculoskeletal:         General: Normal range of motion.      Cervical back: Normal range of motion and neck supple.   Neurological:      General: No focal deficit present.      Mental Status: She is alert and oriented to person, place, and time.   Skin:     General: Skin is warm and dry.   Psychiatric:         Mood and Affect: Mood normal.         Behavior: Behavior normal.         Thought Content: Thought content normal.         Judgment: Judgment normal.   Vitals reviewed.      Bedside ultrasound shows IUD in cavity.         Assessment/Plan   Problem List Items Addressed This Visit    None  Visit Diagnoses         Codes    Routine postpartum follow-up    -  Primary Z39.2          Follow up: 1 year /PRN

## 2024-04-15 ENCOUNTER — HOSPITAL ENCOUNTER (OUTPATIENT)
Facility: HOSPITAL | Age: 34
End: 2024-04-15
Attending: OBSTETRICS & GYNECOLOGY | Admitting: OBSTETRICS & GYNECOLOGY
Payer: COMMERCIAL

## 2024-04-15 ENCOUNTER — HOSPITAL ENCOUNTER (OUTPATIENT)
Facility: HOSPITAL | Age: 34
Discharge: STILL ADMITTED OR ADMINISTRATIVE | End: 2024-04-15
Attending: OBSTETRICS & GYNECOLOGY | Admitting: OBSTETRICS & GYNECOLOGY
Payer: COMMERCIAL

## 2024-04-15 ENCOUNTER — HOSPITAL ENCOUNTER (EMERGENCY)
Facility: HOSPITAL | Age: 34
Discharge: HOME | End: 2024-04-15
Payer: COMMERCIAL

## 2024-04-15 ENCOUNTER — TELEPHONE (OUTPATIENT)
Dept: OBSTETRICS AND GYNECOLOGY | Facility: CLINIC | Age: 34
End: 2024-04-15
Payer: COMMERCIAL

## 2024-04-15 VITALS
RESPIRATION RATE: 18 BRPM | WEIGHT: 133 LBS | DIASTOLIC BLOOD PRESSURE: 81 MMHG | OXYGEN SATURATION: 98 % | HEART RATE: 71 BPM | TEMPERATURE: 97.5 F | BODY MASS INDEX: 22.16 KG/M2 | HEIGHT: 65 IN | SYSTOLIC BLOOD PRESSURE: 118 MMHG

## 2024-04-15 PROCEDURE — 4500999001 HC ED NO CHARGE

## 2024-04-15 ASSESSMENT — PAIN - FUNCTIONAL ASSESSMENT: PAIN_FUNCTIONAL_ASSESSMENT: 0-10

## 2024-04-15 ASSESSMENT — COLUMBIA-SUICIDE SEVERITY RATING SCALE - C-SSRS
6. HAVE YOU EVER DONE ANYTHING, STARTED TO DO ANYTHING, OR PREPARED TO DO ANYTHING TO END YOUR LIFE?: NO
1. IN THE PAST MONTH, HAVE YOU WISHED YOU WERE DEAD OR WISHED YOU COULD GO TO SLEEP AND NOT WAKE UP?: NO
2. HAVE YOU ACTUALLY HAD ANY THOUGHTS OF KILLING YOURSELF?: NO

## 2024-04-15 ASSESSMENT — PAIN DESCRIPTION - LOCATION: LOCATION: HEAD

## 2024-04-15 ASSESSMENT — PAIN SCALES - GENERAL: PAINLEVEL_OUTOF10: 7

## 2024-04-15 ASSESSMENT — PAIN DESCRIPTION - PAIN TYPE: TYPE: ACUTE PAIN

## 2024-04-15 NOTE — TELEPHONE ENCOUNTER
Contacted pt and verified name and .  Pt states headaches started two weeks ago pt states she feels flushed and dizzy which started around the time of the headaches, pt states headaches doesn't cause nausea or vomiting, no bleeding or vaginal issues. Pt states 600 mg Ibuprofen isn't effective. Pt denies having blood pressure issues.  Pt advised per Dr. Aguayo to go get seen in the ER, pt states she is going to today..  Pt states understanding and denies having questions at this time.

## 2024-04-16 ENCOUNTER — TELEPHONE (OUTPATIENT)
Dept: OBSTETRICS AND GYNECOLOGY | Facility: CLINIC | Age: 34
End: 2024-04-16
Payer: COMMERCIAL

## 2024-04-16 DIAGNOSIS — Z30.431 IUD CHECK UP: Primary | ICD-10-CM

## 2024-04-16 NOTE — ED TRIAGE NOTES
Pt reports to ED for Migraines x2 weeks. Pt states its primarily located in the occipital region. Postpartum 8 weeks. Pt reports fever, chills, intense neck pain, pt states pain is at the base of head. Pt unable to be seen by L&D du to criteria. Pt afebrile. Pt reports dizziness, denies nausea and vomiting

## 2024-04-16 NOTE — TELEPHONE ENCOUNTER
Contacted pt and verified name and .  Pt notified of per Dr. Nichols that she should reach out to her PCP in regards to headaches.  Pt wants to know if she can have orders placed for an ultrasound to view her Iud strings. Message sent to provider.  Pt states understanding and denies having questions at this time.

## 2024-05-01 ENCOUNTER — HOSPITAL ENCOUNTER (OUTPATIENT)
Dept: RADIOLOGY | Facility: HOSPITAL | Age: 34
Discharge: HOME | End: 2024-05-01
Payer: COMMERCIAL

## 2024-05-01 DIAGNOSIS — Z30.431 IUD CHECK UP: ICD-10-CM

## 2024-05-01 PROCEDURE — 76856 US EXAM PELVIC COMPLETE: CPT | Performed by: RADIOLOGY

## 2024-05-01 PROCEDURE — 76856 US EXAM PELVIC COMPLETE: CPT

## 2024-05-01 PROCEDURE — 76830 TRANSVAGINAL US NON-OB: CPT | Performed by: RADIOLOGY

## 2024-05-03 ENCOUNTER — TELEPHONE (OUTPATIENT)
Dept: OBSTETRICS AND GYNECOLOGY | Facility: CLINIC | Age: 34
End: 2024-05-03
Payer: COMMERCIAL

## 2024-05-03 NOTE — TELEPHONE ENCOUNTER
contacted pt  name and  verified  Pt aware IUD is appropriately placed per [place]   pt verbalized understanding  no further questions or concerns at this time

## 2024-05-03 NOTE — TELEPHONE ENCOUNTER
----- Message from Shakira Mayberry RN sent at 5/3/2024  8:44 AM EDT -----    ----- Message -----  From: Brisa Nichols MD  Sent: 5/3/2024   7:56 AM EDT  To: [address] Clinical Support Staff    IUD is appropriately placed.

## 2024-11-26 ENCOUNTER — LAB (OUTPATIENT)
Dept: LAB | Facility: LAB | Age: 34
End: 2024-11-26
Payer: COMMERCIAL

## 2024-11-26 ENCOUNTER — TELEMEDICINE (OUTPATIENT)
Dept: PRIMARY CARE | Facility: CLINIC | Age: 34
End: 2024-11-26
Payer: COMMERCIAL

## 2024-11-26 DIAGNOSIS — R39.9 UTI SYMPTOMS: ICD-10-CM

## 2024-11-26 DIAGNOSIS — R39.9 UTI SYMPTOMS: Primary | ICD-10-CM

## 2024-11-26 LAB
APPEARANCE UR: CLEAR
BILIRUB UR STRIP.AUTO-MCNC: NEGATIVE MG/DL
COLOR UR: COLORLESS
GLUCOSE UR STRIP.AUTO-MCNC: NORMAL MG/DL
KETONES UR STRIP.AUTO-MCNC: NEGATIVE MG/DL
LEUKOCYTE ESTERASE UR QL STRIP.AUTO: NEGATIVE
MUCOUS THREADS #/AREA URNS AUTO: NORMAL /LPF
NITRITE UR QL STRIP.AUTO: NEGATIVE
PH UR STRIP.AUTO: 6 [PH]
PROT UR STRIP.AUTO-MCNC: NEGATIVE MG/DL
RBC # UR STRIP.AUTO: ABNORMAL /UL
RBC #/AREA URNS AUTO: NORMAL /HPF
SP GR UR STRIP.AUTO: 1.01
SQUAMOUS #/AREA URNS AUTO: NORMAL /HPF
UROBILINOGEN UR STRIP.AUTO-MCNC: NORMAL MG/DL
WBC #/AREA URNS AUTO: NORMAL /HPF

## 2024-11-26 PROCEDURE — 81001 URINALYSIS AUTO W/SCOPE: CPT

## 2024-11-26 PROCEDURE — 99213 OFFICE O/P EST LOW 20 MIN: CPT | Performed by: INTERNAL MEDICINE

## 2024-11-26 PROCEDURE — 87086 URINE CULTURE/COLONY COUNT: CPT

## 2024-11-26 PROCEDURE — 1036F TOBACCO NON-USER: CPT | Performed by: INTERNAL MEDICINE

## 2024-11-26 NOTE — PROGRESS NOTES
Subjective   Patient ID: Ellen Crawley is a 33 y.o. female who presents for No chief complaint on file..    HPI     Patient is a 33-year-old female who presents with chief complaint of burning on urination fatigue and tired.  She has been going to the bathroom more frequently.  She reports a little bit of blood in the urine.  Occasional pain in the back.  No fevers or chills.  Concern for urinary tract infection.  She states she has an IUD and generally speaking does not bleed with menstrual periods.    Review of Systems:  Constitutional: No fever or chills  Cardiovascular: no chest pain, no palpitations and no syncope.   Respiratory: no cough, no shortness of breath during exertion and no shortness of breath at rest.   Gastrointestinal: no abdominal pain, no nausea and no vomiting.  Neuro: No Headache, no dizziness    Physical Exam:   Constitutional: Alert and in no acute distress. Well developed, well nourished.   Head and Face: Head and face: Normal.     Eyes: Normal external exam.    Ears, Nose, Mouth, and Throat: External inspection of ears and nose: Normal.  Hearing: Normal.   Neck: No neck mass was observed. Supple.  Pulmonary: No respiratory distress.    Musculoskeletal: Range of motion: Normal.     Skin: Normal skin color and pigmentation, normal skin turgor, and no rash.    Neurologic: Coordination: Normal.    Psychiatric: Judgment and insight: Intact. Mood and affect: Normal.    Assessment/Plan   Problem List Items Addressed This Visit    None  Visit Diagnoses         Codes    UTI symptoms    -  Primary R39.9    Relevant Orders    Urinalysis with Reflex Microscopic    Urine Culture            1.  Concern for urinary tract infection.  Will send for urinalysis as well as urine culture and treat if positive.  Patient has an allergy to clindamycin.  Will call with results of testing      This Medical recommendation has been made based on the Telephonic/Video conversation and the history is given by the patient,  which I as a Physician and the patient also understand that there are limitations given the lack of an in-person Physical Exam. Patient will call back if symptoms don't improve.        A Doctor is always available by phone when the office is closed. Please feel free to call for help with any problem that you feel shouldn't wait until the office re-opens.     Socrates Ram, DO

## 2024-11-27 LAB — BACTERIA UR CULT: NO GROWTH

## 2025-01-13 ENCOUNTER — LAB (OUTPATIENT)
Dept: LAB | Facility: LAB | Age: 35
End: 2025-01-13
Payer: COMMERCIAL

## 2025-01-13 ENCOUNTER — OFFICE VISIT (OUTPATIENT)
Dept: PRIMARY CARE | Facility: CLINIC | Age: 35
End: 2025-01-13
Payer: COMMERCIAL

## 2025-01-13 VITALS
DIASTOLIC BLOOD PRESSURE: 79 MMHG | BODY MASS INDEX: 19.64 KG/M2 | HEART RATE: 75 BPM | OXYGEN SATURATION: 98 % | WEIGHT: 118 LBS | TEMPERATURE: 98.4 F | SYSTOLIC BLOOD PRESSURE: 120 MMHG

## 2025-01-13 DIAGNOSIS — O44.03 PLACENTA PREVIA IN THIRD TRIMESTER (HHS-HCC): ICD-10-CM

## 2025-01-13 DIAGNOSIS — R30.0 DYSURIA: ICD-10-CM

## 2025-01-13 DIAGNOSIS — R21 RASH: ICD-10-CM

## 2025-01-13 DIAGNOSIS — R53.83 OTHER FATIGUE: ICD-10-CM

## 2025-01-13 DIAGNOSIS — F90.2 ATTENTION DEFICIT HYPERACTIVITY DISORDER (ADHD), COMBINED TYPE: Primary | ICD-10-CM

## 2025-01-13 DIAGNOSIS — R39.89 BLADDER PAIN: ICD-10-CM

## 2025-01-13 PROBLEM — Z28.39 RUBELLA NON-IMMUNE STATUS, ANTEPARTUM (HHS-HCC): Status: RESOLVED | Noted: 2023-10-11 | Resolved: 2025-01-13

## 2025-01-13 PROBLEM — O09.899 RUBELLA NON-IMMUNE STATUS, ANTEPARTUM (HHS-HCC): Status: RESOLVED | Noted: 2023-10-11 | Resolved: 2025-01-13

## 2025-01-13 LAB
25(OH)D3 SERPL-MCNC: 16 NG/ML (ref 30–100)
ALBUMIN SERPL BCP-MCNC: 4.8 G/DL (ref 3.4–5)
ALP SERPL-CCNC: 61 U/L (ref 33–110)
ALT SERPL W P-5'-P-CCNC: 11 U/L (ref 7–45)
ANION GAP SERPL CALC-SCNC: 14 MMOL/L (ref 10–20)
APPEARANCE UR: CLEAR
AST SERPL W P-5'-P-CCNC: 14 U/L (ref 9–39)
BASOPHILS # BLD AUTO: 0.02 X10*3/UL (ref 0–0.1)
BASOPHILS NFR BLD AUTO: 0.3 %
BILIRUB SERPL-MCNC: 0.5 MG/DL (ref 0–1.2)
BILIRUB UR STRIP.AUTO-MCNC: NEGATIVE MG/DL
BUN SERPL-MCNC: 14 MG/DL (ref 6–23)
CALCIUM SERPL-MCNC: 10.4 MG/DL (ref 8.6–10.6)
CHLORIDE SERPL-SCNC: 102 MMOL/L (ref 98–107)
CO2 SERPL-SCNC: 28 MMOL/L (ref 21–32)
COLOR UR: COLORLESS
CREAT SERPL-MCNC: 0.7 MG/DL (ref 0.5–1.05)
CRP SERPL-MCNC: <0.1 MG/DL
EGFRCR SERPLBLD CKD-EPI 2021: >90 ML/MIN/1.73M*2
EOSINOPHIL # BLD AUTO: 0.02 X10*3/UL (ref 0–0.7)
EOSINOPHIL NFR BLD AUTO: 0.3 %
ERYTHROCYTE [DISTWIDTH] IN BLOOD BY AUTOMATED COUNT: 12.2 % (ref 11.5–14.5)
ERYTHROCYTE [SEDIMENTATION RATE] IN BLOOD BY WESTERGREN METHOD: 2 MM/H (ref 0–20)
EST. AVERAGE GLUCOSE BLD GHB EST-MCNC: 97 MG/DL
FERRITIN SERPL-MCNC: 48 NG/ML (ref 8–150)
GLUCOSE SERPL-MCNC: 82 MG/DL (ref 74–99)
GLUCOSE UR STRIP.AUTO-MCNC: NORMAL MG/DL
HBA1C MFR BLD: 5 %
HCT VFR BLD AUTO: 42.8 % (ref 36–46)
HGB BLD-MCNC: 14.2 G/DL (ref 12–16)
IMM GRANULOCYTES # BLD AUTO: 0.02 X10*3/UL (ref 0–0.7)
IMM GRANULOCYTES NFR BLD AUTO: 0.3 % (ref 0–0.9)
INR PPP: 1.1 (ref 0.9–1.1)
IRON SATN MFR SERPL: 40 % (ref 25–45)
IRON SERPL-MCNC: 140 UG/DL (ref 35–150)
KETONES UR STRIP.AUTO-MCNC: NEGATIVE MG/DL
LEUKOCYTE ESTERASE UR QL STRIP.AUTO: NEGATIVE
LYMPHOCYTES # BLD AUTO: 1.71 X10*3/UL (ref 1.2–4.8)
LYMPHOCYTES NFR BLD AUTO: 22.3 %
MCH RBC QN AUTO: 30.3 PG (ref 26–34)
MCHC RBC AUTO-ENTMCNC: 33.2 G/DL (ref 32–36)
MCV RBC AUTO: 92 FL (ref 80–100)
MONOCYTES # BLD AUTO: 0.34 X10*3/UL (ref 0.1–1)
MONOCYTES NFR BLD AUTO: 4.4 %
NEUTROPHILS # BLD AUTO: 5.57 X10*3/UL (ref 1.2–7.7)
NEUTROPHILS NFR BLD AUTO: 72.4 %
NITRITE UR QL STRIP.AUTO: NEGATIVE
NRBC BLD-RTO: 0 /100 WBCS (ref 0–0)
PH UR STRIP.AUTO: 7.5 [PH]
PLATELET # BLD AUTO: 218 X10*3/UL (ref 150–450)
POTASSIUM SERPL-SCNC: 4.2 MMOL/L (ref 3.5–5.3)
PROT SERPL-MCNC: 7.6 G/DL (ref 6.4–8.2)
PROT UR STRIP.AUTO-MCNC: NEGATIVE MG/DL
PROTHROMBIN TIME: 11.9 SECONDS (ref 9.8–12.8)
RBC # BLD AUTO: 4.68 X10*6/UL (ref 4–5.2)
RBC # UR STRIP.AUTO: NEGATIVE /UL
RHEUMATOID FACT SER NEPH-ACNC: <10 IU/ML (ref 0–15)
SODIUM SERPL-SCNC: 140 MMOL/L (ref 136–145)
SP GR UR STRIP.AUTO: 1
TIBC SERPL-MCNC: 354 UG/DL (ref 240–445)
TSH SERPL-ACNC: 1.37 MIU/L (ref 0.44–3.98)
UIBC SERPL-MCNC: 214 UG/DL (ref 110–370)
UROBILINOGEN UR STRIP.AUTO-MCNC: NORMAL MG/DL
VIT B12 SERPL-MCNC: 362 PG/ML (ref 211–911)
WBC # BLD AUTO: 7.7 X10*3/UL (ref 4.4–11.3)

## 2025-01-13 PROCEDURE — 86200 CCP ANTIBODY: CPT

## 2025-01-13 PROCEDURE — 81003 URINALYSIS AUTO W/O SCOPE: CPT

## 2025-01-13 PROCEDURE — 82607 VITAMIN B-12: CPT

## 2025-01-13 PROCEDURE — 84443 ASSAY THYROID STIM HORMONE: CPT

## 2025-01-13 PROCEDURE — 82306 VITAMIN D 25 HYDROXY: CPT

## 2025-01-13 PROCEDURE — 80053 COMPREHEN METABOLIC PANEL: CPT

## 2025-01-13 PROCEDURE — 86038 ANTINUCLEAR ANTIBODIES: CPT

## 2025-01-13 PROCEDURE — 82728 ASSAY OF FERRITIN: CPT

## 2025-01-13 PROCEDURE — 85652 RBC SED RATE AUTOMATED: CPT

## 2025-01-13 PROCEDURE — 85610 PROTHROMBIN TIME: CPT

## 2025-01-13 PROCEDURE — 83540 ASSAY OF IRON: CPT

## 2025-01-13 PROCEDURE — 86140 C-REACTIVE PROTEIN: CPT

## 2025-01-13 PROCEDURE — 86431 RHEUMATOID FACTOR QUANT: CPT

## 2025-01-13 PROCEDURE — 99214 OFFICE O/P EST MOD 30 MIN: CPT | Performed by: FAMILY MEDICINE

## 2025-01-13 PROCEDURE — 83550 IRON BINDING TEST: CPT

## 2025-01-13 PROCEDURE — 83036 HEMOGLOBIN GLYCOSYLATED A1C: CPT

## 2025-01-13 PROCEDURE — 1036F TOBACCO NON-USER: CPT | Performed by: FAMILY MEDICINE

## 2025-01-13 PROCEDURE — 85025 COMPLETE CBC W/AUTO DIFF WBC: CPT

## 2025-01-13 RX ORDER — LISDEXAMFETAMINE DIMESYLATE 40 MG/1
40 CAPSULE ORAL EVERY MORNING
COMMUNITY
Start: 2024-12-12

## 2025-01-13 RX ORDER — SERTRALINE HYDROCHLORIDE 100 MG/1
100 TABLET, FILM COATED ORAL
COMMUNITY
Start: 2025-01-05

## 2025-01-13 RX ORDER — LEVONORGESTREL 52 MG/1
1 INTRAUTERINE DEVICE INTRAUTERINE ONCE
COMMUNITY
Start: 2024-02-18

## 2025-01-13 NOTE — PROGRESS NOTES
Subjective   Patient ID: Ellen Crawley is a 34 y.o. female who presents for Bleeding/Bruising and UTI.    HPI     The patient is currently taking Zoloft, Vyvanse and magnesium and lysine supplements. She presents with complaints of lower abdominal pressure and lower back pain. She has also been bruising easily, 3 on the back of her leg. She also has a erythematous rash on her neck and black and blue bruise in her armpits.   She has a  Mirena IUD  since February 2024.     Review of Systems  Constitutional: No fever or chills  Cardiovascular: no chest pain, no palpitations and no syncope.   Respiratory: no cough, no shortness of breath during exertion and no shortness of breath at rest.   Gastrointestinal: no abdominal pain, no nausea and no vomiting.  Neuro: No Headache, no dizziness   Small ecchymosis     Objective   /79   Pulse 75   Temp 36.9 °C (98.4 °F)   Wt 53.5 kg (118 lb)   SpO2 98%   BMI 19.64 kg/m²     Physical Exam  Constitutional: Alert and in no acute distress. Well developed, well nourished  Head and Face: Head and face: Normal.    Cardiovascular: Heart rate and rhythm were normal, normal S1 and S2. No peripheral edema.   Pulmonary: No respiratory distress. Clear bilateral breath sounds.  Musculoskeletal: Gait and station: Normal. Muscle strength/tone: Normal.   Skin: Normal skin color and pigmentation, normal skin turgor, and no rash.    Psychiatric: Judgment and insight: Intact. Mood and affect: Normal.      Lab Results   Component Value Date    WBC 9.5 02/19/2024    HGB 9.0 (L) 02/19/2024    HCT 28.0 (L) 02/19/2024     (L) 02/19/2024    CHOL 195 05/25/2022    TRIG 59 05/25/2022    HDL 65.0 05/25/2022    ALT 8 01/16/2024    AST 13 01/16/2024     01/16/2024    K 3.9 01/16/2024     01/16/2024    CREATININE 0.39 (L) 01/16/2024    BUN 5 (L) 01/16/2024    CO2 20 (L) 01/16/2024    TSH 1.02 05/25/2022    INR 1.0 01/16/2024       US PELVIS TRANSABDOMINAL WITH  TRANSVAGINAL  Narrative: Interpreted By:  Christiano Katz,   STUDY:  US PELVIS TRANSABDOMINAL WITH TRANSVAGINAL;  5/1/2024 7:04 pm      INDICATION:  Signs/Symptoms:IUD strings not seen on exam.      COMPARISON:  None.      ACCESSION NUMBER(S):  [id number]      ORDERING CLINICIAN:  LAEXX FLORES      TECHNIQUE:  Multiple multiplanar static gray scale, color and spectral waveform  sonographic images of the pelvis were obtained. Transabdominal and  endovaginal ultrasound was performed.  Exam was limited by bowel gas.  .      FINDINGS:  UTERUS:  The retroflexed uterus measures  6.2 cm x 4.5 cm x 7.4 cm  .  There  was no focal uterine mass.      ENDOMETRIUM:  There was an appropriately positioned IUD in place. The endometrial  lining could not be measured in this exam.      RIGHT ADNEXA:  Unable to identify the right ovary. No gross right adnexal mass.      LEFT ADNEXA:  Unable to identify the left ovary. No gross left adnexal mass.      CUL DE SAC:  No gross free fluid is seen in the pelvic cul-de-sac.              Impression: Unable to identify either ovary in this exam. No gross adnexal mass  on either side.      Retroflexed uterus. Appropriately positioned IUD in place.      No uterine mass. Could not adequately identify the endometrial lining  due to the IUD.      MACRO:  None      Signed by: Christiano Katz 5/2/2024 8:18 PM  Dictation workstation:   [id number]      Assessment/Plan   Assessment & Plan  Attention deficit hyperactivity disorder (ADHD), combined type  Continue Vyvanse. See's Psych       Rash  Ordered labs.   Orders:    Citrulline Antibody, IgG; Future    C-Reactive Protein; Future    RICK with Reflex to BLADIMIR; Future    Rheumatoid Factor; Future    Sedimentation Rate; Future    Other fatigue  Ordered labs  Orders:    CBC; Future    Comprehensive Metabolic Panel; Future    Hemoglobin A1C; Future    Iron and TIBC; Future    Ferritin; Future    TSH with reflex to Free T4 if abnormal; Future    Vitamin D  25-Hydroxy,Total (for eval of Vitamin D levels); Future    Vitamin B12; Future    Protime-INR; Future    Dysuria    Orders:    Urinalysis with Reflex Culture and Microscopic; Future      Please call me if any questions arise from now until your next visit. I will call you after I am done seeing patients. A Doctor is always available by phone when the office is closed. Please feel free to call for help with any problem that you feel shouldn't wait until the office re-opens.     Scribe Attestation  By signing my name below, I, Anais Hernandez attest that this documentation has been prepared under the direction and in the presence of Tayla Adams MD. All medical record entries made by the Scribe were at my direction or personally dictated by me. I have reviewed the chart and agree that the record accurately reflects my personal performance of the history, physical exam, discussion and plan.

## 2025-01-14 LAB
ANA SER QL HEP2 SUBST: NEGATIVE
CCP IGG SERPL-ACNC: <1 U/ML
HOLD SPECIMEN: NORMAL

## 2025-03-20 ENCOUNTER — APPOINTMENT (OUTPATIENT)
Dept: OBSTETRICS AND GYNECOLOGY | Facility: CLINIC | Age: 35
End: 2025-03-20
Payer: COMMERCIAL

## 2025-03-20 ENCOUNTER — TELEPHONE (OUTPATIENT)
Dept: OBSTETRICS AND GYNECOLOGY | Facility: CLINIC | Age: 35
End: 2025-03-20

## 2025-03-26 ENCOUNTER — TELEPHONE (OUTPATIENT)
Dept: OBSTETRICS AND GYNECOLOGY | Facility: CLINIC | Age: 35
End: 2025-03-26
Payer: COMMERCIAL

## 2025-03-26 DIAGNOSIS — F41.9 ANXIETY: Primary | ICD-10-CM

## 2025-03-26 RX ORDER — LORAZEPAM 1 MG/1
1 TABLET ORAL
Qty: 1 TABLET | Refills: 0 | Status: SHIPPED | OUTPATIENT
Start: 2025-03-26 | End: 2025-03-26

## 2025-03-26 NOTE — TELEPHONE ENCOUNTER
"Contacted pt  Name and  verified   Pt called in stating she was bleeding on her IUD and not feeling well. Inquired about her symptoms, pt stated she recently thought she had a UTI, has been urinating a lot, test results were negative. She just finished atbx for strep throat. Today went to restroom and felt \"large\" gush of blood. Pt said she is still sitting on toilet but the bleeding stops and starts while sitting there. Advised pt to put a pad on to see how much bleeding, informed her if she is soaking through a pad in an hour or passing large clots the size of a chicken egg or larger to call back or head to the ED. Pt is not cramping, or passing clots. Pt agreeable to plan of care.    "

## 2025-04-22 ENCOUNTER — TELEPHONE (OUTPATIENT)
Dept: OBSTETRICS AND GYNECOLOGY | Facility: CLINIC | Age: 35
End: 2025-04-22
Payer: COMMERCIAL

## 2025-04-22 DIAGNOSIS — F41.9 ANXIETY: ICD-10-CM

## 2025-04-22 RX ORDER — LORAZEPAM 1 MG/1
1 TABLET ORAL
Qty: 1 TABLET | Refills: 0 | Status: SHIPPED | OUTPATIENT
Start: 2025-04-22 | End: 2025-04-22

## 2025-04-25 ENCOUNTER — APPOINTMENT (OUTPATIENT)
Dept: OBSTETRICS AND GYNECOLOGY | Facility: CLINIC | Age: 35
End: 2025-04-25
Payer: COMMERCIAL

## 2025-04-25 VITALS
DIASTOLIC BLOOD PRESSURE: 80 MMHG | BODY MASS INDEX: 19.66 KG/M2 | SYSTOLIC BLOOD PRESSURE: 110 MMHG | HEIGHT: 65 IN | WEIGHT: 118 LBS

## 2025-04-25 DIAGNOSIS — T83.32XD INTRAUTERINE CONTRACEPTIVE DEVICE THREADS LOST, SUBSEQUENT ENCOUNTER: Primary | ICD-10-CM

## 2025-04-25 PROCEDURE — 99212 OFFICE O/P EST SF 10 MIN: CPT | Performed by: OBSTETRICS & GYNECOLOGY

## 2025-04-25 PROCEDURE — 3008F BODY MASS INDEX DOCD: CPT | Performed by: OBSTETRICS & GYNECOLOGY

## 2025-04-25 ASSESSMENT — ENCOUNTER SYMPTOMS
CARDIOVASCULAR NEGATIVE: 0
MUSCULOSKELETAL NEGATIVE: 0
HEMATOLOGIC/LYMPHATIC NEGATIVE: 0
ENDOCRINE NEGATIVE: 0
ALLERGIC/IMMUNOLOGIC NEGATIVE: 0
NEUROLOGICAL NEGATIVE: 0
GASTROINTESTINAL NEGATIVE: 0
RESPIRATORY NEGATIVE: 0
CONSTITUTIONAL NEGATIVE: 0
PSYCHIATRIC NEGATIVE: 0
EYES NEGATIVE: 0

## 2025-04-25 ASSESSMENT — PAIN SCALES - GENERAL: PAINLEVEL_OUTOF10: 0-NO PAIN

## 2025-04-25 NOTE — PROGRESS NOTES
Subjective   Patient ID: Ellen Crawley is a 34 y.o. female who presents for Contraception (FU for IUD questions /Luis STANFORD /).  Patient here to discuss IUD removal/bleeding pattern.   Has had 2 episodes of gushes of blood followed by spotting, approximately a month apart. Discussed that this could be a menses.   Interested in another pregnancy, but had a loss after last IUD removal and concerned about a recurrence. Discussed timing of IUD removal. Discussed procedure as strings not visualized.   Patient will follow-up for IUD removal.       Review of Systems   All other systems reviewed and are negative.      Objective   Physical Exam  Constitutional:       Appearance: Normal appearance.   Neurological:      Mental Status: She is alert.   Psychiatric:         Mood and Affect: Mood normal.         Behavior: Behavior normal.         Assessment/Plan   Problem List Items Addressed This Visit    None  Visit Diagnoses         Codes      Intrauterine contraceptive device threads lost, subsequent encounter    -  Primary T83.32XD               Brisa Nichols MD 04/25/25 11:14 AM

## 2025-08-14 ENCOUNTER — APPOINTMENT (OUTPATIENT)
Dept: OBSTETRICS AND GYNECOLOGY | Facility: CLINIC | Age: 35
End: 2025-08-14
Payer: COMMERCIAL

## 2025-08-22 ENCOUNTER — TELEMEDICINE (OUTPATIENT)
Dept: PRIMARY CARE | Facility: CLINIC | Age: 35
End: 2025-08-22
Payer: COMMERCIAL

## 2025-08-23 ENCOUNTER — APPOINTMENT (OUTPATIENT)
Dept: PRIMARY CARE | Facility: CLINIC | Age: 35
End: 2025-08-23
Payer: COMMERCIAL

## 2025-09-03 ENCOUNTER — OFFICE VISIT (OUTPATIENT)
Dept: PRIMARY CARE | Facility: CLINIC | Age: 35
End: 2025-09-03
Payer: COMMERCIAL

## 2025-09-03 VITALS
HEART RATE: 86 BPM | DIASTOLIC BLOOD PRESSURE: 78 MMHG | SYSTOLIC BLOOD PRESSURE: 120 MMHG | HEIGHT: 65 IN | BODY MASS INDEX: 19.83 KG/M2 | WEIGHT: 119 LBS | OXYGEN SATURATION: 95 %

## 2025-09-03 DIAGNOSIS — R42 DIZZINESS: Primary | ICD-10-CM

## 2025-09-03 PROCEDURE — 99213 OFFICE O/P EST LOW 20 MIN: CPT | Performed by: FAMILY MEDICINE

## 2025-09-03 PROCEDURE — 3008F BODY MASS INDEX DOCD: CPT | Performed by: FAMILY MEDICINE

## 2025-09-03 PROCEDURE — 1036F TOBACCO NON-USER: CPT | Performed by: FAMILY MEDICINE

## 2025-09-03 ASSESSMENT — PATIENT HEALTH QUESTIONNAIRE - PHQ9
SUM OF ALL RESPONSES TO PHQ9 QUESTIONS 1 AND 2: 0
1. LITTLE INTEREST OR PLEASURE IN DOING THINGS: NOT AT ALL
2. FEELING DOWN, DEPRESSED OR HOPELESS: NOT AT ALL

## 2025-09-04 LAB
ALBUMIN SERPL-MCNC: 4.7 G/DL (ref 3.6–5.1)
ALP SERPL-CCNC: 41 U/L (ref 31–125)
ALT SERPL-CCNC: 13 U/L (ref 6–29)
ANION GAP SERPL CALCULATED.4IONS-SCNC: 7 MMOL/L (CALC) (ref 7–17)
AST SERPL-CCNC: 14 U/L (ref 10–30)
BILIRUB SERPL-MCNC: 0.5 MG/DL (ref 0.2–1.2)
BUN SERPL-MCNC: 18 MG/DL (ref 7–25)
CALCIUM SERPL-MCNC: 9.1 MG/DL (ref 8.6–10.2)
CHLORIDE SERPL-SCNC: 104 MMOL/L (ref 98–110)
CO2 SERPL-SCNC: 27 MMOL/L (ref 20–32)
CREAT SERPL-MCNC: 0.72 MG/DL (ref 0.5–0.97)
EGFRCR SERPLBLD CKD-EPI 2021: 113 ML/MIN/1.73M2
FERRITIN SERPL-MCNC: 34 NG/ML (ref 16–154)
GLUCOSE SERPL-MCNC: 86 MG/DL (ref 65–99)
IRON SATN MFR SERPL: 44 % (CALC) (ref 16–45)
IRON SERPL-MCNC: 137 MCG/DL (ref 40–190)
MAGNESIUM SERPL-MCNC: 2 MG/DL (ref 1.5–2.5)
POTASSIUM SERPL-SCNC: 4.3 MMOL/L (ref 3.5–5.3)
PROT SERPL-MCNC: 7 G/DL (ref 6.1–8.1)
SODIUM SERPL-SCNC: 138 MMOL/L (ref 135–146)
TIBC SERPL-MCNC: 313 MCG/DL (CALC) (ref 250–450)

## 2025-09-18 ENCOUNTER — APPOINTMENT (OUTPATIENT)
Dept: OBSTETRICS AND GYNECOLOGY | Facility: CLINIC | Age: 35
End: 2025-09-18
Payer: COMMERCIAL

## (undated) DEVICE — TOWEL PACK, STERILE, 4/PACK, BLUE

## (undated) DEVICE — SUTURE, VICRYL, 0, 36 IN, CT, UNDYED

## (undated) DEVICE — DRAPE PACK, CESAREAN SECTION, CUSTOM, UHC

## (undated) DEVICE — SUTURE, VICRYL, 0, 18 IN, VIOLET